# Patient Record
Sex: MALE | Race: BLACK OR AFRICAN AMERICAN | Employment: OTHER | URBAN - METROPOLITAN AREA
[De-identification: names, ages, dates, MRNs, and addresses within clinical notes are randomized per-mention and may not be internally consistent; named-entity substitution may affect disease eponyms.]

---

## 2017-09-01 ENCOUNTER — APPOINTMENT (OUTPATIENT)
Dept: GENERAL RADIOLOGY | Age: 62
DRG: 247 | End: 2017-09-01
Attending: EMERGENCY MEDICINE
Payer: MEDICARE

## 2017-09-01 ENCOUNTER — HOSPITAL ENCOUNTER (INPATIENT)
Age: 62
LOS: 3 days | Discharge: HOME | DRG: 247 | End: 2017-09-04
Attending: EMERGENCY MEDICINE | Admitting: INTERNAL MEDICINE
Payer: MEDICARE

## 2017-09-01 DIAGNOSIS — N17.9 ACUTE RENAL FAILURE, UNSPECIFIED ACUTE RENAL FAILURE TYPE (HCC): ICD-10-CM

## 2017-09-01 DIAGNOSIS — R07.9 ACUTE CHEST PAIN: Primary | ICD-10-CM

## 2017-09-01 DIAGNOSIS — R77.8 ELEVATED TROPONIN: ICD-10-CM

## 2017-09-01 PROBLEM — I10 ESSENTIAL HYPERTENSION: Status: ACTIVE | Noted: 2017-09-01

## 2017-09-01 LAB
ALBUMIN SERPL-MCNC: 3.6 G/DL (ref 3.5–5)
ALBUMIN/GLOB SERPL: 0.9 {RATIO} (ref 1.1–2.2)
ALP SERPL-CCNC: 60 U/L (ref 45–117)
ALT SERPL-CCNC: 40 U/L (ref 12–78)
ANION GAP SERPL CALC-SCNC: 5 MMOL/L (ref 5–15)
AST SERPL-CCNC: 22 U/L (ref 15–37)
ATRIAL RATE: 47 BPM
BASOPHILS # BLD: 0 K/UL (ref 0–0.1)
BASOPHILS NFR BLD: 0 % (ref 0–1)
BILIRUB SERPL-MCNC: 0.4 MG/DL (ref 0.2–1)
BUN SERPL-MCNC: 21 MG/DL (ref 6–20)
BUN/CREAT SERPL: 13 (ref 12–20)
CALCIUM SERPL-MCNC: 9.3 MG/DL (ref 8.5–10.1)
CALCULATED P AXIS, ECG09: 14 DEGREES
CALCULATED R AXIS, ECG10: -16 DEGREES
CALCULATED T AXIS, ECG11: 94 DEGREES
CHLORIDE SERPL-SCNC: 104 MMOL/L (ref 97–108)
CK SERPL-CCNC: 153 U/L (ref 39–308)
CO2 SERPL-SCNC: 28 MMOL/L (ref 21–32)
CREAT SERPL-MCNC: 1.62 MG/DL (ref 0.7–1.3)
D DIMER PPP FEU-MCNC: 0.19 MG/L FEU (ref 0–0.65)
DIAGNOSIS, 93000: NORMAL
EOSINOPHIL # BLD: 0.3 K/UL (ref 0–0.4)
EOSINOPHIL NFR BLD: 4 % (ref 0–7)
ERYTHROCYTE [DISTWIDTH] IN BLOOD BY AUTOMATED COUNT: 13.7 % (ref 11.5–14.5)
GLOBULIN SER CALC-MCNC: 3.9 G/DL (ref 2–4)
GLUCOSE SERPL-MCNC: 97 MG/DL (ref 65–100)
HCT VFR BLD AUTO: 48.6 % (ref 36.6–50.3)
HGB BLD-MCNC: 17.1 G/DL (ref 12.1–17)
LYMPHOCYTES # BLD: 2.7 K/UL (ref 0.8–3.5)
LYMPHOCYTES NFR BLD: 31 % (ref 12–49)
MCH RBC QN AUTO: 29.5 PG (ref 26–34)
MCHC RBC AUTO-ENTMCNC: 35.2 G/DL (ref 30–36.5)
MCV RBC AUTO: 83.8 FL (ref 80–99)
MONOCYTES # BLD: 0.8 K/UL (ref 0–1)
MONOCYTES NFR BLD: 9 % (ref 5–13)
NEUTS SEG # BLD: 5 K/UL (ref 1.8–8)
NEUTS SEG NFR BLD: 56 % (ref 32–75)
P-R INTERVAL, ECG05: 148 MS
PLATELET # BLD AUTO: 273 K/UL (ref 150–400)
POTASSIUM SERPL-SCNC: 3.3 MMOL/L (ref 3.5–5.1)
PROT SERPL-MCNC: 7.5 G/DL (ref 6.4–8.2)
Q-T INTERVAL, ECG07: 424 MS
QRS DURATION, ECG06: 98 MS
QTC CALCULATION (BEZET), ECG08: 375 MS
RBC # BLD AUTO: 5.8 M/UL (ref 4.1–5.7)
SODIUM SERPL-SCNC: 137 MMOL/L (ref 136–145)
TROPONIN I SERPL-MCNC: 0.47 NG/ML
VENTRICULAR RATE, ECG03: 47 BPM
WBC # BLD AUTO: 8.8 K/UL (ref 4.1–11.1)

## 2017-09-01 PROCEDURE — 74011000250 HC RX REV CODE- 250: Performed by: EMERGENCY MEDICINE

## 2017-09-01 PROCEDURE — 36415 COLL VENOUS BLD VENIPUNCTURE: CPT | Performed by: EMERGENCY MEDICINE

## 2017-09-01 PROCEDURE — C9113 INJ PANTOPRAZOLE SODIUM, VIA: HCPCS | Performed by: INTERNAL MEDICINE

## 2017-09-01 PROCEDURE — 74011250636 HC RX REV CODE- 250/636: Performed by: INTERNAL MEDICINE

## 2017-09-01 PROCEDURE — 85379 FIBRIN DEGRADATION QUANT: CPT | Performed by: EMERGENCY MEDICINE

## 2017-09-01 PROCEDURE — 84484 ASSAY OF TROPONIN QUANT: CPT | Performed by: EMERGENCY MEDICINE

## 2017-09-01 PROCEDURE — 96374 THER/PROPH/DIAG INJ IV PUSH: CPT

## 2017-09-01 PROCEDURE — 65660000000 HC RM CCU STEPDOWN

## 2017-09-01 PROCEDURE — 93005 ELECTROCARDIOGRAM TRACING: CPT

## 2017-09-01 PROCEDURE — 74011250637 HC RX REV CODE- 250/637: Performed by: EMERGENCY MEDICINE

## 2017-09-01 PROCEDURE — 74011250637 HC RX REV CODE- 250/637: Performed by: INTERNAL MEDICINE

## 2017-09-01 PROCEDURE — 80053 COMPREHEN METABOLIC PANEL: CPT | Performed by: EMERGENCY MEDICINE

## 2017-09-01 PROCEDURE — 74011250636 HC RX REV CODE- 250/636: Performed by: EMERGENCY MEDICINE

## 2017-09-01 PROCEDURE — 74011000250 HC RX REV CODE- 250: Performed by: INTERNAL MEDICINE

## 2017-09-01 PROCEDURE — 85025 COMPLETE CBC W/AUTO DIFF WBC: CPT | Performed by: EMERGENCY MEDICINE

## 2017-09-01 PROCEDURE — 99285 EMERGENCY DEPT VISIT HI MDM: CPT

## 2017-09-01 PROCEDURE — 82550 ASSAY OF CK (CPK): CPT | Performed by: EMERGENCY MEDICINE

## 2017-09-01 PROCEDURE — 71020 XR CHEST PA LAT: CPT

## 2017-09-01 RX ORDER — SODIUM CHLORIDE 0.9 % (FLUSH) 0.9 %
5-10 SYRINGE (ML) INJECTION AS NEEDED
Status: DISCONTINUED | OUTPATIENT
Start: 2017-09-01 | End: 2017-09-04 | Stop reason: HOSPADM

## 2017-09-01 RX ORDER — KETOROLAC TROMETHAMINE 30 MG/ML
30 INJECTION, SOLUTION INTRAMUSCULAR; INTRAVENOUS
Status: COMPLETED | OUTPATIENT
Start: 2017-09-01 | End: 2017-09-01

## 2017-09-01 RX ORDER — ASPIRIN 81 MG/1
81 TABLET ORAL
COMMUNITY

## 2017-09-01 RX ORDER — ASPIRIN 325 MG
325 TABLET ORAL ONCE
Status: COMPLETED | OUTPATIENT
Start: 2017-09-01 | End: 2017-09-01

## 2017-09-01 RX ORDER — ACETAMINOPHEN 325 MG/1
650 TABLET ORAL
Status: DISCONTINUED | OUTPATIENT
Start: 2017-09-01 | End: 2017-09-04 | Stop reason: HOSPADM

## 2017-09-01 RX ORDER — SIMETHICONE 125 MG
125-375 CAPSULE ORAL
COMMUNITY

## 2017-09-01 RX ORDER — CALCIUM CARBONATE 200(500)MG
200 TABLET,CHEWABLE ORAL
Status: DISCONTINUED | OUTPATIENT
Start: 2017-09-01 | End: 2017-09-04 | Stop reason: HOSPADM

## 2017-09-01 RX ORDER — NITROGLYCERIN 0.4 MG/1
0.4 TABLET SUBLINGUAL ONCE
Status: COMPLETED | OUTPATIENT
Start: 2017-09-01 | End: 2017-09-01

## 2017-09-01 RX ORDER — POTASSIUM CHLORIDE 20 MEQ/1
40 TABLET, EXTENDED RELEASE ORAL
Status: COMPLETED | OUTPATIENT
Start: 2017-09-01 | End: 2017-09-01

## 2017-09-01 RX ORDER — LANOLIN ALCOHOL/MO/W.PET/CERES
3 CREAM (GRAM) TOPICAL
Status: DISCONTINUED | OUTPATIENT
Start: 2017-09-01 | End: 2017-09-04 | Stop reason: HOSPADM

## 2017-09-01 RX ORDER — SODIUM CHLORIDE 9 MG/ML
100 INJECTION, SOLUTION INTRAVENOUS CONTINUOUS
Status: DISCONTINUED | OUTPATIENT
Start: 2017-09-01 | End: 2017-09-03

## 2017-09-01 RX ORDER — SODIUM CHLORIDE 0.9 % (FLUSH) 0.9 %
5-10 SYRINGE (ML) INJECTION EVERY 8 HOURS
Status: DISCONTINUED | OUTPATIENT
Start: 2017-09-01 | End: 2017-09-04 | Stop reason: HOSPADM

## 2017-09-01 RX ORDER — MORPHINE SULFATE 4 MG/ML
2 INJECTION, SOLUTION INTRAMUSCULAR; INTRAVENOUS
Status: DISCONTINUED | OUTPATIENT
Start: 2017-09-01 | End: 2017-09-04 | Stop reason: HOSPADM

## 2017-09-01 RX ADMIN — ACETAMINOPHEN 650 MG: 325 TABLET ORAL at 22:29

## 2017-09-01 RX ADMIN — LIDOCAINE HYDROCHLORIDE 40 ML: 20 SOLUTION ORAL; TOPICAL at 16:19

## 2017-09-01 RX ADMIN — NITROGLYCERIN 0.5 INCH: 20 OINTMENT TOPICAL at 17:33

## 2017-09-01 RX ADMIN — SODIUM CHLORIDE 100 ML/HR: 900 INJECTION, SOLUTION INTRAVENOUS at 20:13

## 2017-09-01 RX ADMIN — NITROGLYCERIN 0.4 MG: 0.4 TABLET SUBLINGUAL at 22:55

## 2017-09-01 RX ADMIN — ASPIRIN 325 MG ORAL TABLET 325 MG: 325 PILL ORAL at 17:32

## 2017-09-01 RX ADMIN — MELATONIN 3 MG ORAL TABLET 3 MG: 3 TABLET ORAL at 22:29

## 2017-09-01 RX ADMIN — POTASSIUM CHLORIDE 40 MEQ: 1500 TABLET, EXTENDED RELEASE ORAL at 20:51

## 2017-09-01 RX ADMIN — SODIUM CHLORIDE 40 MG: 9 INJECTION INTRAMUSCULAR; INTRAVENOUS; SUBCUTANEOUS at 20:52

## 2017-09-01 RX ADMIN — Medication 10 ML: at 20:52

## 2017-09-01 RX ADMIN — KETOROLAC TROMETHAMINE 30 MG: 30 INJECTION, SOLUTION INTRAMUSCULAR at 15:58

## 2017-09-01 RX ADMIN — NITROGLYCERIN 1 INCH: 20 OINTMENT TOPICAL at 22:29

## 2017-09-01 NOTE — CONSULTS
Subjective:                 [address], [place], [address] [facility]  [phone]    Date of  Admission: 9/1/2017  2:58 PM     Admission type:Emergency    Jose Norton is a 63 y.o. male presents with sscp that occurs when eating. It is relieved post eating but then he had an episode today that was unrelated to po intake. In the er, the pain was relieved with gi cocktail. It has been going on and off for the last 5 days. Patient Active Problem List    Diagnosis Date Noted    ARF (acute renal failure) ([facility] [address] 09/01/2017    Essential hypertension 09/01/2017    Troponin I above reference range 09/01/2017      Liban Maldonado, MD  Past Medical History:   Diagnosis Date    Colon polyps     High cholesterol     Hypertension       Past Surgical History:   Procedure Laterality Date    HX TONSILLECTOMY       No Known Allergies   Social History   Substance Use Topics    Smoking status: Never Smoker    Smokeless tobacco: Never Used    Alcohol use No     History reviewed. No pertinent family history. No current facility-administered medications for this encounter. Current Outpatient Prescriptions   Medication Sig    aspirin delayed-release 81 mg tablet Take 81 mg by mouth daily as needed (chest pain).  simethicone (GAS-X) 125 mg capsule Take 125-375 mg by mouth daily as needed (chest pain).  TETRAHYDROZOLINE HCL (VISINE OP) Administer 2 Drops to both eyes daily as needed (dry eyes).  atenolol-chlorthalidone (TENORETIC) 50-25 mg per tablet Take 1 Tab by mouth daily.  garlic cap Take 1 Cap by mouth daily.          Review of Symptoms:  Constitutional: negative  Eyes: negative  Ears, nose, mouth, throat, and face: negative  Respiratory: negative  Cardiovascular: sscp as above  Gastrointestinal: pain with eating  Genitourinary: negative  Musculoskeletal: negative  Neurological: negative  Behvioral/Psych: negative  Endocrine: negative     Subjective:      Visit Vitals    BP (!) 162/99    Pulse (!) 45    Temp 97.9 °F (36.6 °C)    Resp 8    Ht 5' 9\" (1.753 m)    Wt 259 lb (117.5 kg)    SpO2 99%    BMI 38.25 kg/m2       Physical Exam  Abdomen: soft, non-tender. Extremities: extremities normal  Heart: regular rate and rhythm  Lungs: clear to auscultation bilaterally  Pulses: 2+ and symmetric    Cardiographics    Telemetry: nsr    ECG: nsr, lvh with repol    Echocardiogram: pending    Labs:  Recent Labs      09/01/17   1534   WBC  8.8   HGB  17.1*   HCT  48.6   PLT  273     Recent Labs      09/01/17   1534   NA  137   K  3.3*   CL  104   CO2  28   GLU  97   BUN  21*   CREA  1.62*   CA  9.3   ALB  3.6   TBILI  0.4   SGOT  22   ALT  40       Recent Labs      09/01/17   1534   TROIQ  0.47*   CPK  153       No intake or output data in the 24 hours ending 09/01/17 1853      Assessment:     Assessment:       Active Problems:    ARF (acute renal failure) ([facility] [address] (9/1/2017)      Essential hypertension (9/1/2017)      Troponin I above reference range (9/1/2017)         Plan:     Talia Lane is a pleasant gentleman with sscp/epigastric pain that occurs with po intake and is relieved wit gi cocktail. He had a positive trop in the setting of elevated cr (no baseline). Dw Dr Baron Montelongo and hospitalist are kind enough to admit - we will hydrate - if cr less than 1.5 tmrw, will proceed with cardiac cath. Cont asa/atenolol. Will check statin. I discussed the risks/benefits/alternatives of the procedure with the patient. Risks include (but are not limited to) bleeding, heart block, infection, cva/mi/tamponade/death. The patient understands and agrees to proceed. Thank you for this interesting consultation.        Fernando Mauricio MD, [facility]    9/1/2017

## 2017-09-01 NOTE — ED NOTES
Care assumed of patient @ this time & intro with rounding done, with report from __Angelic OWENS RN_________________. Patient resting quietly on stretcher without verbal complaints.

## 2017-09-01 NOTE — IP AVS SNAPSHOT
[address] [facility] 
[phone] Patient: Jamila Rojas MRN: [id number] TPB:91/0/2220 You are allergic to the following Allergen Reactions Clindamycin Diarrhea Recent Documentation Height Weight BMI Smoking Status 1.753 m 117.5 kg 38.25 kg/m2 Never Smoker Emergency Contacts Name Discharge Info Relation Home Work Mobile [address] [facility]  Parent [1] [phone] About your hospitalization You were admitted on:  September 1, 2017 You last received care in the:  [facility] You were discharged on:  September 4, 2017 Unit phone number:  [phone] Why you were hospitalized Your primary diagnosis was:  Not on File Your diagnoses also included:  Arf (Acute Renal Failure) (Hcc), Essential Hypertension, Troponin I Above Reference Range, Chest Pain Providers Seen During Your Hospitalizations Provider Role Specialty Primary office phone Peewee Camarillo MD Attending Provider Emergency Medicine [phone] Adolph Aguillon MD Attending Provider Internal Medicine [phone] Your Primary Care Physician (PCP) Primary Care Physician Office Phone Office Fax OTHER, PHYS ** None ** ** None ** Follow-up Information Follow up With Details Comments Contact Info Liban Maldonado MD   Patient can only remember the practice name and not the physician Liban Maldonado MD In 1 week  Patient can only remember the practice name and not the physician Belinda Morataya MD In 1 week  [address] 
[phone] Current Discharge Medication List  
  
START taking these medications Dose & Instructions Dispensing Information Comments Morning Noon Evening Bedtime  
 amLODIPine 10 mg tablet Commonly known as:  Skip Newcomer Your last dose was: Your next dose is:    
   
   
 Dose:  10 mg Take 1 Tab by mouth daily for 30 days. Quantity:  30 Tab Refills:  0  
     
   
   
   
  
 atorvastatin 40 mg tablet Commonly known as:  LIPITOR Your last dose was: Your next dose is:    
   
   
 Dose:  40 mg Take 1 Tab by mouth daily for 30 days. Quantity:  30 Tab Refills:  0  
     
   
   
   
  
 clopidogrel 75 mg Tab Commonly known as:  PLAVIX Your last dose was: Your next dose is:    
   
   
 Dose:  75 mg Take 1 Tab by mouth daily for 30 days. Quantity:  30 Tab Refills:  0  
     
   
   
   
  
 famotidine 20 mg tablet Commonly known as:  PEPCID Your last dose was: Your next dose is:    
   
   
 Dose:  20 mg Take 1 Tab by mouth two (2) times a day for 14 days. Quantity:  28 Tab Refills:  0  
     
   
   
   
  
 isosorbide mononitrate ER 30 mg tablet Commonly known as:  IMDUR Your last dose was: Your next dose is:    
   
   
 Dose:  30 mg Take 1 Tab by mouth daily for 30 days. Quantity:  30 Tab Refills:  0 CONTINUE these medications which have NOT CHANGED Dose & Instructions Dispensing Information Comments Morning Noon Evening Bedtime  
 aspirin delayed-release 81 mg tablet Your last dose was: Your next dose is:    
   
   
 Dose:  81 mg Take 81 mg by mouth daily as needed (chest pain). Refills:  0  
     
   
   
   
  
 garlic Cap Your last dose was: Your next dose is:    
   
   
 Dose:  1 Cap Take 1 Cap by mouth daily. Refills:  0 Gas-X 125 mg capsule Generic drug:  simethicone Your last dose was: Your next dose is:    
   
   
 Dose:  125-375 mg Take 125-375 mg by mouth daily as needed (chest pain). Refills:  0  
     
   
   
   
  
 VISINE OP Your last dose was: Your next dose is:    
   
   
 Dose:  2 Drop Administer 2 Drops to both eyes daily as needed (dry eyes). Refills:  0 STOP taking these medications   
 atenolol-chlorthalidone 50-25 mg per tablet Commonly known as:  Lindsay Kill Where to Get Your Medications Information on where to get these meds will be given to you by the nurse or doctor. ! Ask your nurse or doctor about these medications  
  amLODIPine 10 mg tablet  
 atorvastatin 40 mg tablet  
 clopidogrel 75 mg Tab  
 famotidine 20 mg tablet  
 isosorbide mononitrate ER 30 mg tablet Discharge Instructions Diet : Heart Health diet. Activity: Avoid heavy exertion for next 2 weeks until cleared by your cardiologist. 
 
Mehrdad Roland in 1 week with Pcp. Discharge Orders None ACO Transitions of Care Introducing [employer] Jasmyne Zeng offers a voluntary care coordination program to provide high quality service and care to [facility] fee-for-service beneficiaries. Selena Conley was designed to help you enhance your health and well-being through the following services: ? Transitions of Care  support for individuals who are transitioning from one care setting to another (example: Hospital to home). ? Chronic and Complex Care Coordination  support for individuals and caregivers of those with serious or chronic illnesses or with more than one chronic (ongoing) condition and those who take a number of different medications. If you meet specific medical criteria, a [facility] [address] may call you directly to coordinate your care with your primary care physician and your other care providers. For questions about the [facility] programs, please, contact your physicians office. For general questions or additional information about Accountable Care Organizations: 
Please visit www.medicare.gov/acos. html or call 1-800-MEDICARE (8-515.549.5217) [employer] users should call 7-303.714.7122. Introducing [facility]! Claudy Severino introduces [employer] patient portal. Now you can access parts of your medical record, email your doctor's office, and request medication refills online. 1. In your internet browser, go to [email or web address] 2. Click on the First Time User? Click Here link in the Sign In box. You will see the New Member Sign Up page. 3. Enter your [employer] Access Code exactly as it appears below. You will not need to use this code after youve completed the sign-up process. If you do not sign up before the expiration date, you must request a new code. · [employer] Access Code: [email or web address] Expires: 11/30/2017  3:07 PM 
 
4. Enter the last four digits of your Social Security Number (xxxx) and Date of Birth (mm/dd/yyyy) as indicated and click Submit. You will be taken to the next sign-up page. 5. Create a [employer] ID. This will be your [employer] login ID and cannot be changed, so think of one that is secure and easy to remember. 6. Create a [employer] password. You can change your password at any time. 7. Enter your Password Reset Question and Answer. This can be used at a later time if you forget your password. 8. Enter your e-mail address. You will receive e-mail notification when new information is available in [address] 9. Click Sign Up. You can now view and download portions of your medical record. 10. Click the Download Summary menu link to download a portable copy of your medical information. If you have questions, please visit the Frequently Asked Questions section of the [employer] website. Remember, [employer] is NOT to be used for urgent needs. For medical emergencies, dial 911. Now available from your iPhone and Android! General Information Please provide this summary of care documentation to your next provider.  
  
  
    
    
 Patient Signature: ____________________________________________________________ Date:  ____________________________________________________________  
  
Jose Shackle Provider Signature:  ____________________________________________________________ Date:  ____________________________________________________________

## 2017-09-01 NOTE — PROGRESS NOTES
Pharmacy Clarification of Prior to Admission Medication Regimen     The patient was interviewed regarding clarification of the prior to admission medication regimen and was questioned regarding use of any other inhalers, topical products, over the counter medications, herbal medications, vitamin products or ophthalmic/nasal/otic medication use. Information Obtained From: Patient    Pertinent Pharmacy Findings:   Amlodipine 10 mg: Patient stated he was prescribed this agent 'a month ago' (10 mg daily) but is not taking this agent.  aspirin delayed-release 81 mg tablet: Patient stated he has only taken this agent the 'past week' due to his chest pain/discomfort.  simethicone (GAS-X) 125 mg capsule: Patient stated he has only taken this agent 'the past few days' due to his chest pain/discomfort.  garlic cap: Patient stated he started the agent 8/29/17, and only took two doses (8/29/17 and 8/30/17) before he 'stopped taking them', because he thought 'they were causing his chest pain/discomfort'. PTA medication list was corrected to the following:     Prior to Admission Medications   Prescriptions Last Dose Informant Patient Reported? Taking? TETRAHYDROZOLINE HCL (VISINE OP) 8/31/2017 at Unknown time Self Yes Yes   Sig: Administer 2 Drops to both eyes daily as needed (dry eyes). aspirin delayed-release 81 mg tablet 8/31/2017 at Unknown time Self Yes Yes   Sig: Take 81 mg by mouth daily as needed (chest pain). atenolol-chlorthalidone (TENORETIC) 50-25 mg per tablet 9/1/2017 at Unknown time Self Yes Yes   Sig: Take 1 Tab by mouth daily. garlic cap 2/82/8786 at Unknown time Self Yes No   Sig: Take 1 Cap by mouth daily. simethicone (GAS-X) 125 mg capsule 9/1/2017 at Unknown time Self Yes Yes   Sig: Take 125-375 mg by mouth daily as needed (chest pain).       Facility-Administered Medications: None          Thank you,  Melvin Blood CP  Medication History Pharmacy Technician

## 2017-09-01 NOTE — ED NOTES
Bedside and Verbal shift change report given to Gabriel Heard RN (oncoming nurse) by Steph Mccurdy RN (offgoing nurse). Report included the following information SBAR, Kardex and ED Summary.

## 2017-09-01 NOTE — ED PROVIDER NOTES
HPI Comments: Richard Hook is a 63 y.o. male with PMHx of HTN and high cholesterol presenting ambulatory to [facility] c/o intermittent mid/right sided chest pain since 5 days ago. Pt notes that 5 days ago he first had the chest pain, and he took some aspirin which resolved his symptoms. However, today at ~1300 his chest pain returned. Pt rates the chest pain as 8-9/10. At the onset of his pain, he was driving. Pt notes that his chest pain is exacerbated by walking and when taking a deep breath. He reports additional symptoms of bilateral hand pain, right shoulder pain, right upper back pain, left arm pain, SOB, and diaphoresis. Pt notes that he has recently driven to [facility], and to [facility]. He reports that he has had a stress test a few years ago which was normal. Pt denies nausea, coughing, leg pain, leg swelling, neck pain, jaw pain, or recent heavy lifting. PCP: Liban Maldonado MD  Social Hx: - tobacco, - EtOH    There are no other complaints, changes, or physical findings at this time. The history is provided by the patient. No  was used. Past Medical History:   Diagnosis Date    Colon polyps     High cholesterol     Hypertension        Past Surgical History:   Procedure Laterality Date    HX TONSILLECTOMY           Family History:   Problem Relation Age of Onset    Coronary Artery Disease Father        Social History     Social History    Marital status: SINGLE     Spouse name: N/A    Number of children: N/A    Years of education: N/A     Occupational History    Not on file. Social History Main Topics    Smoking status: Never Smoker    Smokeless tobacco: Never Used    Alcohol use No    Drug use: No    Sexual activity: Not on file     Other Topics Concern    Not on file     Social History Narrative         ALLERGIES: Review of patient's allergies indicates no known allergies. Review of Systems   Constitutional: Positive for diaphoresis. Negative for fever. HENT: Negative. Negative for congestion. Eyes: Negative. Respiratory: Positive for shortness of breath. Negative for cough. Cardiovascular: Positive for chest pain. Negative for leg swelling. Gastrointestinal: Negative for nausea and vomiting. Endocrine: Negative for heat intolerance. Genitourinary: Negative. Negative for dysuria. Musculoskeletal: Positive for arthralgias (bilateral hands, right shoulder), back pain (right upper) and myalgias (left arm). Negative for neck pain. Skin: Negative. Allergic/Immunologic: Negative for immunocompromised state. Neurological: Negative. Negative for dizziness. Hematological: Does not bruise/bleed easily. Psychiatric/Behavioral: Negative. All other systems reviewed and are negative. Patient Vitals for the past 12 hrs:   Temp Pulse Resp BP SpO2   09/01/17 2030 - (!) 51 10 149/84 97 %   09/01/17 2021 - (!) 51 13 153/88 97 %   09/01/17 1926 - (!) 46 16 (!) 170/95 98 %   09/01/17 1900 - (!) 53 19 (!) 163/101 97 %   09/01/17 1849 - (!) 49 12 - 97 %   09/01/17 1848 - - - (!) 171/99 -   09/01/17 1830 - (!) 46 15 (!) 195/99 99 %   09/01/17 1730 - (!) 45 8 (!) 162/99 99 %   09/01/17 1700 - (!) 46 10 (!) 161/94 98 %   09/01/17 1632 - (!) 45 9 148/89 97 %   09/01/17 1520 97.9 °F (36.6 °C) (!) 48 24 (!) 161/95 96 %   09/01/17 1508 - - - - 98 %   09/01/17 1507 - - - (!) 161/95 -            Physical Exam   Constitutional: He is oriented to person, place, and time. He appears well-nourished. No distress. Elevated BMI   HENT:   Head: Normocephalic and atraumatic. Eyes: EOM are normal. Pupils are equal, round, and reactive to light. Neck: Normal range of motion. Neck supple. Cardiovascular: Regular rhythm and normal heart sounds. Bradycardia present. Pulmonary/Chest: Effort normal and breath sounds normal. He has no wheezes. He exhibits no tenderness. No chest wall tenderness   Abdominal: Soft.  Bowel sounds are normal. There is no tenderness. Musculoskeletal: Normal range of motion. He exhibits tenderness. He exhibits no edema. Right posterior shoulder tenderness, non reproducible   Right upper back pain, non reproducible  No calf edema or tenderness   Neurological: He is alert and oriented to person, place, and time. No cranial nerve deficit. Skin: Skin is warm and dry. Psychiatric: He has a normal mood and affect. Nursing note and vitals reviewed. MDM  Number of Diagnoses or Management Options  Acute chest pain:   Acute renal failure, unspecified acute renal failure type [facility]):   Elevated troponin:   Diagnosis management comments: DDx: CAD, PE, pleurisy, musculoskeletal pain, PUD, gastritis       Amount and/or Complexity of Data Reviewed  Clinical lab tests: ordered and reviewed  Tests in the radiology section of CPT®: ordered and reviewed  Tests in the medicine section of CPT®: ordered and reviewed  Review and summarize past medical records: yes  Discuss the patient with other providers: yes (Cardiology)  Independent visualization of images, tracings, or specimens: yes    Patient Progress  Patient progress: stable    ED Course       Procedures    EKG interpretation: (Preliminary)  15:03  Rhythm: sinus bradycardia; and regular . Rate (approx.): 47; Axis: normal; VT interval: normal; QRS interval: normal ; ST/T wave: T wave abnormality; Other findings: No prior EKGs.    4:09 PM  Pt reports that the pain that was on his left side is better now. However, the pain that his in the middle of his chest is a 7/10. He reports that his chest pain feels more like a \"burning\" sensation now. Written by JOSÉ Burns, as dictated by Lopez Perez MD.    4:46 PM  Pt reports that his chest pain is gone with GI cocktail. He notes that he has a sensation on his left side, but it is mostly when he moves.   Written by JOSÉ Burns, as dictated by Lopez Perez MD.    CONSULT NOTE:  4:58 PM  Lopez Perez MD spoke with Dr. Heidy Bruno  Specialty: Cardiology  Discussed pt's hx, disposition, and available diagnostic and imaging results. Reviewed care plans. Consultant agrees with plans as outlined. Dr. Joaquim Robison will come evaluate pt. Written by Wyatt Cordova, ED Scribe, as dictated by Ferd Mohs, MD.    CONSULT NOTE:  6:48 PM  Ferd Mohs, MD spoke with Dr. Heidy Bruno  Specialty: Cardiology  Discussed pt's hx, disposition, and available diagnostic and imaging results. Reviewed care plans. Consultant agrees with plans as outlined. Dr. Joaquim Robison said that he will have the hospitalist admit the pt. Written by Wyatt Cordova, JOSÉ Scribe, as dictated by Ferd Mohs, MD.    LABORATORY TESTS:  Recent Results (from the past 12 hour(s))   EKG, 12 LEAD, INITIAL    Collection Time: 09/01/17  3:03 PM   Result Value Ref Range    Ventricular Rate 47 BPM    Atrial Rate 47 BPM    P-R Interval 148 ms    QRS Duration 98 ms    Q-T Interval 424 ms    QTC Calculation (Bezet) 375 ms    Calculated P Axis 14 degrees    Calculated R Axis -16 degrees    Calculated T Axis 94 degrees    Diagnosis       Sinus bradycardia  Voltage criteria for left ventricular hypertrophy with repolarization   abnormality  No previous ECGs available  Confirmed by Rubén Morales ([id number]) on 9/1/2017 4:23:38 PM     CBC WITH AUTOMATED DIFF    Collection Time: 09/01/17  3:34 PM   Result Value Ref Range    WBC 8.8 4.1 - 11.1 K/uL    RBC 5.80 (H) 4.10 - 5.70 M/uL    HGB 17.1 (H) 12.1 - 17.0 g/dL    HCT 48.6 36.6 - 50.3 %    MCV 83.8 80.0 - 99.0 FL    MCH 29.5 26.0 - 34.0 PG    MCHC 35.2 30.0 - 36.5 g/dL    RDW 13.7 11.5 - 14.5 %    PLATELET 406 883 - 606 K/uL    NEUTROPHILS 56 32 - 75 %    LYMPHOCYTES 31 12 - 49 %    MONOCYTES 9 5 - 13 %    EOSINOPHILS 4 0 - 7 %    BASOPHILS 0 0 - 1 %    ABS. NEUTROPHILS 5.0 1.8 - 8.0 K/UL    ABS. LYMPHOCYTES 2.7 0.8 - 3.5 K/UL    ABS. MONOCYTES 0.8 0.0 - 1.0 K/UL    ABS. EOSINOPHILS 0.3 0.0 - 0.4 K/UL    ABS.  BASOPHILS 0.0 0.0 - 0.1 K/UL   METABOLIC PANEL, COMPREHENSIVE    Collection Time: 09/01/17  3:34 PM   Result Value Ref Range    Sodium 137 136 - 145 mmol/L    Potassium 3.3 (L) 3.5 - 5.1 mmol/L    Chloride 104 97 - 108 mmol/L    CO2 28 21 - 32 mmol/L    Anion gap 5 5 - 15 mmol/L    Glucose 97 65 - 100 mg/dL    BUN 21 (H) 6 - 20 MG/DL    Creatinine 1.62 (H) 0.70 - 1.30 MG/DL    BUN/Creatinine ratio 13 12 - 20      GFR est AA 53 (L) >60 ml/min/1.73m2    GFR est non-AA 44 (L) >60 ml/min/1.73m2    Calcium 9.3 8.5 - 10.1 MG/DL    Bilirubin, total 0.4 0.2 - 1.0 MG/DL    ALT (SGPT) 40 12 - 78 U/L    AST (SGOT) 22 15 - 37 U/L    Alk. phosphatase 60 45 - 117 U/L    Protein, total 7.5 6.4 - 8.2 g/dL    Albumin 3.6 3.5 - 5.0 g/dL    Globulin 3.9 2.0 - 4.0 g/dL    A-G Ratio 0.9 (L) 1.1 - 2.2     CK    Collection Time: 09/01/17  3:34 PM   Result Value Ref Range     39 - 308 U/L   TROPONIN I    Collection Time: 09/01/17  3:34 PM   Result Value Ref Range    Troponin-I, Qt. 0.47 (H) <0.05 ng/mL   D DIMER    Collection Time: 09/01/17  3:34 PM   Result Value Ref Range    D-dimer 0.19 0.00 - 0.65 mg/L FEU       IMAGING RESULTS:    EXAM:  XR CHEST PA LAT     INDICATION:  Intermittent mid/right sided chest pain since 5 days ago.     COMPARISON: None.     FINDINGS: PA and lateral radiographs of the chest demonstrate clear lungs. The  cardiac and mediastinal contours and pulmonary vascularity are normal. There is  tortuosity of the thoracic aorta. The bones and soft tissues are within normal  limits.      IMPRESSION  IMPRESSION: No acute findings.        MEDICATIONS GIVEN:  Medications   0.9% sodium chloride infusion (100 mL/hr IntraVENous New Bag 9/1/17 2013)   sodium chloride (NS) flush 5-10 mL (not administered)   sodium chloride (NS) flush 5-10 mL (10 mL IntraVENous Given 9/1/17 2052)   acetaminophen (TYLENOL) tablet 650 mg (not administered)   melatonin tablet 3 mg (not administered)   pantoprazole (PROTONIX) 40 mg in sodium chloride 0.9 % 10 mL injection (40 mg IntraVENous Given 9/1/17 2052)   calcium carbonate (TUMS) chewable tablet 200 mg [elemental] (not administered)   nitroglycerin (NITROBID) 2 % ointment 1 Inch (not administered)   ketorolac (TORADOL) injection 30 mg (30 mg IntraVENous Given 9/1/17 1558)   mylanta/viscous lidocaine (SHANNON)(GI COCKTAIL) (40 mL Oral Given 9/1/17 1619)   nitroglycerin (NITROBID) 2 % ointment 0.5 Inch (0.5 Inches Topical Given 9/1/17 1733)   aspirin (ASPIRIN) tablet 325 mg (325 mg Oral Given 9/1/17 1732)   potassium chloride (K-DUR, KLOR-CON) SR tablet 40 mEq (40 mEq Oral Given 9/1/17 2051)       IMPRESSION:  1. Acute chest pain    2. Elevated troponin    3. Acute renal failure, unspecified acute renal failure type ([facility] [address]        PLAN:  1. Admit to hospitalist    ADMIT NOTE:  6:48 PM  Patient is being admitted to the hospital by Dr. Hima Mi. The results of their tests and reasons for their admission have been discussed with the patient and/or available family. They convey agreement and understanding for the need to be admitted and for their admission diagnosis. This note is prepared by Serenity Blood, acting as Scribe for MD Dai Hull MD: The scribe's documentation has been prepared under my direction and personally reviewed by me in its entirety. I confirm that the note above accurately reflects all work, treatment, procedures, and medical decision making performed by me.

## 2017-09-01 NOTE — ED TRIAGE NOTES
Patient presents to ED ambulatory with c/o chest pain x5 days that was intermittent. He took aspirin and anti-acids which improved sx. He noted that when he ate the pain would return. Patient reports he came to ED today because the pain started again even tough he had not had anything to eat.

## 2017-09-02 LAB
ALBUMIN SERPL-MCNC: 3.1 G/DL (ref 3.5–5)
ALBUMIN/GLOB SERPL: 0.8 {RATIO} (ref 1.1–2.2)
ALP SERPL-CCNC: 50 U/L (ref 45–117)
ALT SERPL-CCNC: 33 U/L (ref 12–78)
ANION GAP SERPL CALC-SCNC: 8 MMOL/L (ref 5–15)
AST SERPL-CCNC: 21 U/L (ref 15–37)
ATRIAL RATE: 44 BPM
BILIRUB SERPL-MCNC: 0.4 MG/DL (ref 0.2–1)
BUN SERPL-MCNC: 21 MG/DL (ref 6–20)
BUN/CREAT SERPL: 14 (ref 12–20)
CALCIUM SERPL-MCNC: 8.4 MG/DL (ref 8.5–10.1)
CALCULATED P AXIS, ECG09: 23 DEGREES
CALCULATED R AXIS, ECG10: -10 DEGREES
CALCULATED T AXIS, ECG11: 67 DEGREES
CHLORIDE SERPL-SCNC: 107 MMOL/L (ref 97–108)
CHOLEST SERPL-MCNC: 270 MG/DL
CO2 SERPL-SCNC: 24 MMOL/L (ref 21–32)
CREAT SERPL-MCNC: 1.51 MG/DL (ref 0.7–1.3)
DIAGNOSIS, 93000: NORMAL
GLOBULIN SER CALC-MCNC: 3.7 G/DL (ref 2–4)
GLUCOSE SERPL-MCNC: 93 MG/DL (ref 65–100)
HDLC SERPL-MCNC: 34 MG/DL
HDLC SERPL: 7.9 {RATIO} (ref 0–5)
INR PPP: 1.1 (ref 0.9–1.1)
LDLC SERPL CALC-MCNC: 185.8 MG/DL (ref 0–100)
LIPID PROFILE,FLP: ABNORMAL
MAGNESIUM SERPL-MCNC: 2 MG/DL (ref 1.6–2.4)
P-R INTERVAL, ECG05: 152 MS
POTASSIUM SERPL-SCNC: 3.7 MMOL/L (ref 3.5–5.1)
PROT SERPL-MCNC: 6.8 G/DL (ref 6.4–8.2)
PROTHROMBIN TIME: 10.6 SEC (ref 9–11.1)
Q-T INTERVAL, ECG07: 480 MS
QRS DURATION, ECG06: 98 MS
QTC CALCULATION (BEZET), ECG08: 410 MS
SODIUM SERPL-SCNC: 139 MMOL/L (ref 136–145)
TRIGL SERPL-MCNC: 251 MG/DL (ref ?–150)
TROPONIN I SERPL-MCNC: 0.66 NG/ML
VENTRICULAR RATE, ECG03: 44 BPM
VLDLC SERPL CALC-MCNC: 50.2 MG/DL

## 2017-09-02 PROCEDURE — 74011000250 HC RX REV CODE- 250: Performed by: INTERNAL MEDICINE

## 2017-09-02 PROCEDURE — 74011250636 HC RX REV CODE- 250/636: Performed by: INTERNAL MEDICINE

## 2017-09-02 PROCEDURE — 36415 COLL VENOUS BLD VENIPUNCTURE: CPT | Performed by: INTERNAL MEDICINE

## 2017-09-02 PROCEDURE — 80053 COMPREHEN METABOLIC PANEL: CPT | Performed by: INTERNAL MEDICINE

## 2017-09-02 PROCEDURE — C9113 INJ PANTOPRAZOLE SODIUM, VIA: HCPCS | Performed by: INTERNAL MEDICINE

## 2017-09-02 PROCEDURE — 84484 ASSAY OF TROPONIN QUANT: CPT | Performed by: INTERNAL MEDICINE

## 2017-09-02 PROCEDURE — 65660000000 HC RM CCU STEPDOWN

## 2017-09-02 PROCEDURE — 74011250637 HC RX REV CODE- 250/637: Performed by: INTERNAL MEDICINE

## 2017-09-02 PROCEDURE — 83735 ASSAY OF MAGNESIUM: CPT | Performed by: INTERNAL MEDICINE

## 2017-09-02 PROCEDURE — 85610 PROTHROMBIN TIME: CPT | Performed by: INTERNAL MEDICINE

## 2017-09-02 PROCEDURE — 93306 TTE W/DOPPLER COMPLETE: CPT

## 2017-09-02 PROCEDURE — 80061 LIPID PANEL: CPT | Performed by: INTERNAL MEDICINE

## 2017-09-02 RX ORDER — AMLODIPINE BESYLATE 5 MG/1
10 TABLET ORAL DAILY
Status: DISCONTINUED | OUTPATIENT
Start: 2017-09-03 | End: 2017-09-04 | Stop reason: HOSPADM

## 2017-09-02 RX ORDER — ATORVASTATIN CALCIUM 40 MG/1
40 TABLET, FILM COATED ORAL DAILY
Status: DISCONTINUED | OUTPATIENT
Start: 2017-09-02 | End: 2017-09-04 | Stop reason: HOSPADM

## 2017-09-02 RX ORDER — GUAIFENESIN 100 MG/5ML
81 LIQUID (ML) ORAL DAILY
Status: DISCONTINUED | OUTPATIENT
Start: 2017-09-02 | End: 2017-09-04 | Stop reason: HOSPADM

## 2017-09-02 RX ORDER — ENOXAPARIN SODIUM 100 MG/ML
1 INJECTION SUBCUTANEOUS ONCE
Status: COMPLETED | OUTPATIENT
Start: 2017-09-02 | End: 2017-09-02

## 2017-09-02 RX ADMIN — NITROGLYCERIN 1 INCH: 20 OINTMENT TOPICAL at 11:26

## 2017-09-02 RX ADMIN — ACETAMINOPHEN 650 MG: 325 TABLET ORAL at 23:07

## 2017-09-02 RX ADMIN — SODIUM CHLORIDE 100 ML/HR: 900 INJECTION, SOLUTION INTRAVENOUS at 18:00

## 2017-09-02 RX ADMIN — NITROGLYCERIN 1 INCH: 20 OINTMENT TOPICAL at 17:53

## 2017-09-02 RX ADMIN — SODIUM CHLORIDE 40 MG: 9 INJECTION INTRAMUSCULAR; INTRAVENOUS; SUBCUTANEOUS at 09:24

## 2017-09-02 RX ADMIN — NITROGLYCERIN 1 INCH: 20 OINTMENT TOPICAL at 23:10

## 2017-09-02 RX ADMIN — SODIUM CHLORIDE 100 ML/HR: 900 INJECTION, SOLUTION INTRAVENOUS at 23:11

## 2017-09-02 RX ADMIN — CALCIUM CARBONATE (ANTACID) CHEW TAB 500 MG 200 MG: 500 CHEW TAB at 23:09

## 2017-09-02 RX ADMIN — ENOXAPARIN SODIUM 120 MG: 60 INJECTION, SOLUTION INTRAVENOUS; SUBCUTANEOUS at 09:17

## 2017-09-02 RX ADMIN — MELATONIN 3 MG ORAL TABLET 3 MG: 3 TABLET ORAL at 23:08

## 2017-09-02 RX ADMIN — ATORVASTATIN CALCIUM 40 MG: 40 TABLET, FILM COATED ORAL at 11:26

## 2017-09-02 RX ADMIN — Medication 10 ML: at 23:08

## 2017-09-02 RX ADMIN — ACETAMINOPHEN 650 MG: 325 TABLET ORAL at 17:53

## 2017-09-02 RX ADMIN — ASPIRIN 81 MG 81 MG: 81 TABLET ORAL at 09:17

## 2017-09-02 RX ADMIN — Medication 10 ML: at 17:54

## 2017-09-02 NOTE — H&P
Hospitalist Admission Note    NAME: Joanna Styles   :  1955   MRN:  [id number]     Date/Time:  2017 8:01 PM    Patient PCP: Lanie Maldonado MD  ________________________________________________________________________    My assessment of this patient's clinical condition and my plan of care is as follows. Assessment / Plan:    Chest pain  Heartburn  Elevated troponin  -atypical CP describing heartburn improved with tums and worse with activity  -telemetry monitoring, cycle troponin  -nitropaste   -PPI and tums prn  -Cardiology plans cath in AM if Cr improves    Bradycardia  HTN  -stop atenolol   -hydralazine prn    JIM  Hypokalemia  -stop chlorthiazide. IVF hydration overnight      Code Status:   Full  Surrogate Decision Maker:  No mPOA    DVT Prophylaxis:   SCD  GI Prophylaxis: not indicated          Subjective:   CHIEF COMPLAINT:   Chest pains    HISTORY OF PRESENT ILLNESS:     Joanna Styles is a 63 y.o.  male who presents with recurrent chest pains. It started about 5 days ago as like heartburn located over his sternum and epigastric area. This improved with tums. It then recurred a day later and resolved with ASA and gas X. Today his heartburn recurred but felt worse with ambulation so he presented to the ER for evaluation. He was found to have an elevated troponin and JIM / Cr 1.6. Cardiology was consulted and we were asked to admit for work up and evaluation of the above problems. He has no prior history of CAD, PE or PUD.       Past Medical History:   Diagnosis Date    Colon polyps     High cholesterol     Hypertension         Past Surgical History:   Procedure Laterality Date    HX TONSILLECTOMY         Social History   Substance Use Topics    Smoking status: Never Smoker    Smokeless tobacco: Never Used    Alcohol use No        Family History   Problem Relation Age of Onset    Coronary Artery Disease Father      No Known Allergies     Prior to Admission medications    Medication Sig Start Date End Date Taking? Authorizing Provider   aspirin delayed-release 81 mg tablet Take 81 mg by mouth daily as needed (chest pain). Yes Liban Maldonado MD   simethicone (GAS-X) 125 mg capsule Take 125-375 mg by mouth daily as needed (chest pain). Yes Liban Maldonado MD   TETRAHYDROZOLINE HCL (VISINE OP) Administer 2 Drops to both eyes daily as needed (dry eyes). Yes Liban Maldonado MD   atenolol-chlorthalidone (TENORETIC) 50-25 mg per tablet Take 1 Tab by mouth daily. Yes Liban Maldonado MD   garlic cap Take 1 Cap by mouth daily.     Liban Maldonado MD       REVIEW OF SYSTEMS:       Total of 12 systems reviewed as follows:       POSITIVE= underlined text  Negative = text not underlined  General:  fever, chills, sweats, generalized weakness, weight loss/gain,      loss of appetite   Eyes:    blurred vision, eye pain, loss of vision, double vision  ENT:    rhinorrhea, pharyngitis   Respiratory:   cough, sputum production, SOB, SURESH, wheezing, pleuritic pain   Cardiology:   chest pain, palpitations, orthopnea, PND, edema, syncope   Gastrointestinal:  abdominal pain , N/V, diarrhea, dysphagia, constipation, bleeding , heartburn  Genitourinary:  frequency, urgency, dysuria, hematuria, incontinence   Muskuloskeletal :  arthralgia, myalgia, back pain  Hematology:  easy bruising, nose or gum bleeding, lymphadenopathy   Dermatological: rash, ulceration, pruritis, color change / jaundice  Endocrine:   hot flashes or polydipsia   Neurological:  headache, dizziness, confusion, focal weakness, paresthesia,     Speech difficulties, memory loss, gait difficulty  Psychological: Feelings of anxiety, depression, agitation    Objective:   VITALS:    Visit Vitals    BP (!) 170/95    Pulse (!) 46    Temp 97.9 °F (36.6 °C)    Resp 16    Ht 5' 9\" (1.753 m)    Wt 117.5 kg (259 lb)    SpO2 98%    BMI 38.25 kg/m2       PHYSICAL EXAM:    General:    Alert, cooperative, no distress, appears stated age.     HEENT: Atraumatic, anicteric sclerae, pink conjunctivae     No oral ulcers, mucosa moist, throat clear, dentition fair  Neck:  Supple, symmetrical,  thyroid: non tender  Lungs:   Clear to auscultation bilaterally. No Wheezing or Rhonchi. No rales. Chest wall:  No tenderness  No Accessory muscle use. Heart:   Regular  rhythm,  No  murmur   No edema  Abdomen:   Soft, non-tender. Not distended. Bowel sounds normal  Extremities: No cyanosis. No clubbing,  Skin turgor normal, Capillary refill normal, Radial dial pulse 2+  Skin:     Not pale. Not Jaundiced  No rashes   Psych:  Good insight. Not depressed. Not anxious or agitated. Neurologic: EOMs intact. No facial asymmetry. No aphasia or slurred speech. Symmetrical strength, Sensation grossly intact. Alert and oriented X 4.     _______________________________________________________________________  Care Plan discussed with:    Comments   Patient x    Family      RN     Care Manager                    Consultant:      _______________________________________________________________________  Expected  Disposition:   Home with Family x   HH/PT/OT/RN    SNF/LTC    [facility]    ________________________________________________________________________  TOTAL TIME:  48   Minutes    Critical Care Provided     Minutes non procedure based      Comments    x Reviewed previous records   >50% of visit spent in counseling and coordination of care  Discussion with patient and/or family and questions answered       Given the patient's current clinical presentation, I have a high level of concern for decompensation if discharged from the ED. Complex decision making was performed which includes reviewing the patient's available past medical records, laboratory results, and Xray films.  I have also directly communicated my plan and discussed this case with the involved ED physician.     ____________________________________________________________________  Loli Grajeda MD    Procedures: see electronic medical records for all procedures/Xrays and details which were not copied into this note but were reviewed prior to creation of Plan. LAB DATA REVIEWED:    Recent Results (from the past 24 hour(s))   EKG, 12 LEAD, INITIAL    Collection Time: 09/01/17  3:03 PM   Result Value Ref Range    Ventricular Rate 47 BPM    Atrial Rate 47 BPM    P-R Interval 148 ms    QRS Duration 98 ms    Q-T Interval 424 ms    QTC Calculation (Bezet) 375 ms    Calculated P Axis 14 degrees    Calculated R Axis -16 degrees    Calculated T Axis 94 degrees    Diagnosis       Sinus bradycardia  Voltage criteria for left ventricular hypertrophy with repolarization   abnormality  No previous ECGs available  Confirmed by Laveda Stain ([id number]) on 9/1/2017 4:23:38 PM     CBC WITH AUTOMATED DIFF    Collection Time: 09/01/17  3:34 PM   Result Value Ref Range    WBC 8.8 4.1 - 11.1 K/uL    RBC 5.80 (H) 4.10 - 5.70 M/uL    HGB 17.1 (H) 12.1 - 17.0 g/dL    HCT 48.6 36.6 - 50.3 %    MCV 83.8 80.0 - 99.0 FL    MCH 29.5 26.0 - 34.0 PG    MCHC 35.2 30.0 - 36.5 g/dL    RDW 13.7 11.5 - 14.5 %    PLATELET 657 021 - 479 K/uL    NEUTROPHILS 56 32 - 75 %    LYMPHOCYTES 31 12 - 49 %    MONOCYTES 9 5 - 13 %    EOSINOPHILS 4 0 - 7 %    BASOPHILS 0 0 - 1 %    ABS. NEUTROPHILS 5.0 1.8 - 8.0 K/UL    ABS. LYMPHOCYTES 2.7 0.8 - 3.5 K/UL    ABS. MONOCYTES 0.8 0.0 - 1.0 K/UL    ABS. EOSINOPHILS 0.3 0.0 - 0.4 K/UL    ABS.  BASOPHILS 0.0 0.0 - 0.1 K/UL   METABOLIC PANEL, COMPREHENSIVE    Collection Time: 09/01/17  3:34 PM   Result Value Ref Range    Sodium 137 136 - 145 mmol/L    Potassium 3.3 (L) 3.5 - 5.1 mmol/L    Chloride 104 97 - 108 mmol/L    CO2 28 21 - 32 mmol/L    Anion gap 5 5 - 15 mmol/L    Glucose 97 65 - 100 mg/dL    BUN 21 (H) 6 - 20 MG/DL    Creatinine 1.62 (H) 0.70 - 1.30 MG/DL    BUN/Creatinine ratio 13 12 - 20      GFR est AA 53 (L) >60 ml/min/1.73m2    GFR est non-AA 44 (L) >60 ml/min/1.73m2    Calcium 9.3 8.5 - 10.1 MG/DL    Bilirubin, total 0.4 0.2 - 1.0 MG/DL    ALT (SGPT) 40 12 - 78 U/L    AST (SGOT) 22 15 - 37 U/L    Alk.  phosphatase 60 45 - 117 U/L    Protein, total 7.5 6.4 - 8.2 g/dL    Albumin 3.6 3.5 - 5.0 g/dL    Globulin 3.9 2.0 - 4.0 g/dL    A-G Ratio 0.9 (L) 1.1 - 2.2     CK    Collection Time: 09/01/17  3:34 PM   Result Value Ref Range     39 - 308 U/L   TROPONIN I    Collection Time: 09/01/17  3:34 PM   Result Value Ref Range    Troponin-I, Qt. 0.47 (H) <0.05 ng/mL   D DIMER    Collection Time: 09/01/17  3:34 PM   Result Value Ref Range    D-dimer 0.19 0.00 - 0.65 mg/L FEU

## 2017-09-02 NOTE — ROUTINE PROCESS
TRANSFER - OUT REPORT:    Verbal report given to Aleks Chandler RN on Stefanie Blankenship  being transferred to [facility] for routine progression of care       Report consisted of patients Situation, Background, Assessment and   Recommendations(SBAR). Information from the following report(s) SBAR, Kardex, ED Summary and MAR was reviewed with the receiving nurse. Opportunity for questions and clarification was provided.       Patient transported with:   Monitor

## 2017-09-02 NOTE — PROGRESS NOTES
Hospitalist Progress Note    NAME: Gigi Mendieta   :  1955   MRN:  [id number]       Assessment / Plan:  Chest pain atypical pain likely form GERD  -PPI and tums prn    Possible NSTEMI hemodynamically stable   -For cath today  -Cont asa and statin. No on betablocker due to bradycardia. -cardiology following.         Sinus Bradycardia asymptomatic : Check TSH. Atenolol held. Telemetry monitoring. HTN  -stop atenolol   -hydralazine prn  -will start norvasc     JIM likely prerenal :  Continue iv fluids. Repeat bmp in am. Not on nephrotoxic medications.  -creatinine might go up due to angiogram    Hypokalemia  -stop chlorthiazide. IVF hydration overnight  -k is normal        Code Status:   Full  Surrogate Decision Maker:  No mPOA     DVT Prophylaxis:   SCD  GI Prophylaxis: not indicated      Body mass index is 38.25 kg/(m^2). Recommended Disposition: Home in 1-2 days     Subjective:     Chief Complaint / Reason for Physician Visit  He still has some heart burn type of chest pain. No sob. No cough or phlegm. No abdominal pain. Review of Systems:  Symptom Y/N Comments  Symptom Y/N Comments   Fever/Chills n   Chest Pain y    Poor Appetite    Edema n    Cough n   Abdominal Pain n    Sputum n   Joint Pain n    SOB/SURESH n   Pruritis/Rash     Nausea/vomit n   Tolerating PT/OT     Diarrhea n   Tolerating Diet     Constipation    Other       Could NOT obtain due to:      Objective:     VITALS:   Last 24hrs VS reviewed since prior progress note.  Most recent are:  Patient Vitals for the past 24 hrs:   Temp Pulse Resp BP SpO2   17 1123 98 °F (36.7 °C) (!) 49 17 135/88 98 %   17 0703 97.9 °F (36.6 °C) (!) 42 17 (!) 134/97 98 %   17 0322 98.1 °F (36.7 °C) (!) 49 18 158/88 97 %   17 2330 - (!) 52 - (!) 158/97 98 %   17 2324 - (!) 53 - (!) 168/99 98 %   17 2300 - (!) 50 - (!) 164/99 98 %   17 2256 - (!) 44 - (!) 176/102 99 %   17 2245 - (!) 46 - (!) 181/105 99 % 09/01/17 2227 - 61 20 (!) 206/116 100 %   09/01/17 2121 98.1 °F (36.7 °C) (!) 46 16 (!) 178/103 96 %   09/01/17 2030 - (!) 51 10 149/84 97 %   09/01/17 2021 - (!) 51 13 153/88 97 %   09/01/17 1926 - (!) 46 16 (!) 170/95 98 %   09/01/17 1900 - (!) 53 19 (!) 163/101 97 %   09/01/17 1849 - (!) 49 12 - 97 %   09/01/17 1848 - - - (!) 171/99 -   09/01/17 1830 - (!) 46 15 (!) 195/99 99 %   09/01/17 1730 - (!) 45 8 (!) 162/99 99 %   09/01/17 1700 - (!) 46 10 (!) 161/94 98 %   09/01/17 1632 - (!) 45 9 148/89 97 %     No intake or output data in the 24 hours ending 09/02/17 1529     PHYSICAL EXAM:  General: WD, WN. Alert, cooperative, no acute distress    EENT:  EOMI. Anicteric sclerae. MMM  Resp:  CTA bilaterally, no wheezing or rales. No accessory muscle use  CV:  Regular  rhythm,  No edema  GI:  Soft, Non distended, Non tender.  +Bowel sounds  Neurologic:  Alert and oriented X 3, normal speech,   Psych:   Good insight. Not anxious nor agitated  Skin:  No rashes.   No jaundice    Reviewed most current lab test results and cultures  YES  Reviewed most current radiology test results   YES  Review and summation of old records today    NO  Reviewed patient's current orders and MAR    YES  PMH/SH reviewed - no change compared to H&P        Current Facility-Administered Medications:     aspirin chewable tablet 81 mg, 81 mg, Oral, DAILY, Rohan Euceda MD, 81 mg at 09/02/17 0917    atorvastatin (LIPITOR) tablet 40 mg, 40 mg, Oral, DAILY, Ashley Mi MD, 40 mg at 09/02/17 1126    0.9% sodium chloride infusion, 100 mL/hr, IntraVENous, CONTINUOUS, Eliu Mahmood MD, Last Rate: 100 mL/hr at 09/01/17 2013, 100 mL/hr at 09/01/17 2013    sodium chloride (NS) flush 5-10 mL, 5-10 mL, IntraVENous, Q8H, Ramandeep Blanton MD    sodium chloride (NS) flush 5-10 mL, 5-10 mL, IntraVENous, PRN, Ramandeep Blanton MD, 10 mL at 09/01/17 2052    acetaminophen (TYLENOL) tablet 650 mg, 650 mg, Oral, Q4H PRN, Ramandeep Blanton MD, 650 mg at 09/01/17 2229    melatonin tablet 3 mg, 3 mg, Oral, QHS PRN, Kieran Sales MD, 3 mg at 09/01/17 2229    pantoprazole (PROTONIX) 40 mg in sodium chloride 0.9 % 10 mL injection, 40 mg, IntraVENous, DAILY, Kieran Sales MD, 40 mg at 09/02/17 9609    calcium carbonate (TUMS) chewable tablet 200 mg [elemental], 200 mg, Oral, Q4H PRN, Kieran Sales MD    nitroglycerin (NITROBID) 2 % ointment 1 Inch, 1 Inch, Topical, Q6H, Kieran Sales MD, 1 Inch at 09/02/17 1126    morphine injection 2 mg, 2 mg, IntraVENous, Q2H PRN, Lion Culver MD    ________________________________________________________________________  Care Plan discussed with:    Comments   Patient y    Family      RN y    Care Manager     Consultant                        Multidiciplinary team rounds were held today with , nursing, pharmacist and clinical coordinator. Patient's plan of care was discussed; medications were reviewed and discharge planning was addressed. ________________________________________________________________________  Total NON critical care TIME:  35  Minutes    Total CRITICAL CARE TIME Spent:   Minutes non procedure based      Comments   >50% of visit spent in counseling and coordination of care     ________________________________________________________________________  Mateo Carmona MD     Procedures: see electronic medical records for all procedures/Xrays and details which were not copied into this note but were reviewed prior to creation of Plan. LABS:  I reviewed today's most current labs and imaging studies.   Pertinent labs include:  Recent Labs      09/01/17   1534   WBC  8.8   HGB  17.1*   HCT  48.6   PLT  273     Recent Labs      09/02/17   0327  09/01/17   1534   NA  139  137   K  3.7  3.3*   CL  107  104   CO2  24  28   GLU  93  97   BUN  21*  21*   CREA  1.51*  1.62*   CA  8.4*  9.3   MG  2.0   --    ALB  3.1*  3.6   TBILI  0.4  0.4   SGOT  21  22   ALT  33  40   INR  1.1   --        Signed: Randa Mcleod MD

## 2017-09-02 NOTE — ED NOTES
Patient transported to 2159 by stretcher with monitor with nurse Timur, [address] [facility] EDT

## 2017-09-02 NOTE — PROGRESS NOTES
Cardiology Progress Note            [address], [address] [facility]  [phone]    9/2/2017 4:28 AM    Admit Date: 9/1/2017    Admit Diagnosis: Chest pain    Subjective:     Antonio Newell   Had some more cp overnight - relieved with sl nitro.     Visit Vitals    BP (!) 158/97    Pulse (!) 52    Temp 98.1 °F (36.7 °C)    Resp 20    Ht 5' 9\" (1.753 m)    Wt 259 lb (117.5 kg)    SpO2 98%    BMI 38.25 kg/m2     Current Facility-Administered Medications   Medication Dose Route Frequency    0.9% sodium chloride infusion  100 mL/hr IntraVENous CONTINUOUS    sodium chloride (NS) flush 5-10 mL  5-10 mL IntraVENous Q8H    sodium chloride (NS) flush 5-10 mL  5-10 mL IntraVENous PRN    acetaminophen (TYLENOL) tablet 650 mg  650 mg Oral Q4H PRN    melatonin tablet 3 mg  3 mg Oral QHS PRN    pantoprazole (PROTONIX) 40 mg in sodium chloride 0.9 % 10 mL injection  40 mg IntraVENous DAILY    calcium carbonate (TUMS) chewable tablet 200 mg [elemental]  200 mg Oral Q4H PRN    nitroglycerin (NITROBID) 2 % ointment 1 Inch  1 Inch Topical Q6H    morphine injection 2 mg  2 mg IntraVENous Q2H PRN         Objective:      Visit Vitals    BP (!) 158/97    Pulse (!) 52    Temp 98.1 °F (36.7 °C)    Resp 20    Ht 5' 9\" (1.753 m)    Wt 259 lb (117.5 kg)    SpO2 98%    BMI 38.25 kg/m2       Physical Exam:  Abdomen: soft, non-tender  Extremities: extremities normal  Heart: regular rate and rhythm  Lungs: clear to auscultation bilaterally  Pulses: 2+ and symmetric    Data Review:   Labs:    Recent Labs      09/01/17   1534   WBC  8.8   HGB  17.1*   HCT  48.6   PLT  273     Recent Labs      09/02/17   0327  09/01/17   1534   NA  139  137   K  3.7  3.3*   CL  107  104   CO2  24  28   GLU  93  97   BUN  21*  21*   CREA  1.51*  1.62*   CA  8.4*  9.3   MG  2.0   --    ALB  3.1*  3.6   TBILI  0.4  0.4   SGOT  21  22   ALT  33  40   INR  1.1   --        Recent Labs      09/02/17   0327  09/01/17   1534   MICHELLE 0.66*  0.47*   CPK   --   153       No intake or output data in the 24 hours ending 09/02/17 0428     Telemetry: teresita fish    Assessment:     Active Problems:    ARF (acute renal failure) ([address] (9/1/2017)      Essential hypertension (9/1/2017)      Troponin I above reference range (9/1/2017)      Chest pain (9/1/2017)        Plan:     Paul Ovalle is having intermittent chest pain relieved with nitro. His cr has improved with hydration and his trop has gone up. Will proceed with cath this am. Npo.  Dr Lluvia Broussard will perform    Lion Culver MD, [facility]    9/2/2017

## 2017-09-02 NOTE — PROGRESS NOTES
Patient remains NPO. Groin/radial prepped for cardiac cath. No s/s of distress. VSS. Family remains at bedside. Consents signed and placed on chart.

## 2017-09-02 NOTE — CARDIO/PULMONARY
C/P REHAB:  Chart reviewed. Admitted with chest pain and elevated troponin, for cardiac cath today. Also found to have acute renal failure on admission. PMH significant for HTN and high cholesterol. 2D echo pending. Nonsmoker. Will continue to follow for education and support as indicated.

## 2017-09-03 LAB
ANION GAP SERPL CALC-SCNC: 7 MMOL/L (ref 5–15)
BUN SERPL-MCNC: 18 MG/DL (ref 6–20)
BUN/CREAT SERPL: 13 (ref 12–20)
CALCIUM SERPL-MCNC: 8.1 MG/DL (ref 8.5–10.1)
CHLORIDE SERPL-SCNC: 109 MMOL/L (ref 97–108)
CO2 SERPL-SCNC: 25 MMOL/L (ref 21–32)
CREAT SERPL-MCNC: 1.42 MG/DL (ref 0.7–1.3)
ERYTHROCYTE [DISTWIDTH] IN BLOOD BY AUTOMATED COUNT: 13.7 % (ref 11.5–14.5)
GLUCOSE SERPL-MCNC: 95 MG/DL (ref 65–100)
HCT VFR BLD AUTO: 43.9 % (ref 36.6–50.3)
HGB BLD-MCNC: 15.1 G/DL (ref 12.1–17)
MCH RBC QN AUTO: 29.5 PG (ref 26–34)
MCHC RBC AUTO-ENTMCNC: 34.4 G/DL (ref 30–36.5)
MCV RBC AUTO: 85.9 FL (ref 80–99)
PLATELET # BLD AUTO: 233 K/UL (ref 150–400)
POTASSIUM SERPL-SCNC: 3.5 MMOL/L (ref 3.5–5.1)
RBC # BLD AUTO: 5.11 M/UL (ref 4.1–5.7)
SODIUM SERPL-SCNC: 141 MMOL/L (ref 136–145)
TSH SERPL DL<=0.05 MIU/L-ACNC: 3.11 UIU/ML (ref 0.36–3.74)
WBC # BLD AUTO: 8.2 K/UL (ref 4.1–11.1)

## 2017-09-03 PROCEDURE — 027034Z DILATION OF CORONARY ARTERY, ONE ARTERY WITH DRUG-ELUTING INTRALUMINAL DEVICE, PERCUTANEOUS APPROACH: ICD-10-PCS | Performed by: INTERNAL MEDICINE

## 2017-09-03 PROCEDURE — 74011250636 HC RX REV CODE- 250/636: Performed by: INTERNAL MEDICINE

## 2017-09-03 PROCEDURE — 80048 BASIC METABOLIC PNL TOTAL CA: CPT | Performed by: INTERNAL MEDICINE

## 2017-09-03 PROCEDURE — C1894 INTRO/SHEATH, NON-LASER: HCPCS

## 2017-09-03 PROCEDURE — C1887 CATHETER, GUIDING: HCPCS

## 2017-09-03 PROCEDURE — 85027 COMPLETE CBC AUTOMATED: CPT | Performed by: INTERNAL MEDICINE

## 2017-09-03 PROCEDURE — C1769 GUIDE WIRE: HCPCS

## 2017-09-03 PROCEDURE — 77030004549 HC CATH ANGI DX PRF MRTM -A

## 2017-09-03 PROCEDURE — 74011250637 HC RX REV CODE- 250/637: Performed by: INTERNAL MEDICINE

## 2017-09-03 PROCEDURE — 4A033BC MEASUREMENT OF ARTERIAL PRESSURE, CORONARY, PERCUTANEOUS APPROACH: ICD-10-PCS | Performed by: INTERNAL MEDICINE

## 2017-09-03 PROCEDURE — 77030019698 HC SYR ANGI MDLON MRTM -A

## 2017-09-03 PROCEDURE — 36415 COLL VENOUS BLD VENIPUNCTURE: CPT | Performed by: INTERNAL MEDICINE

## 2017-09-03 PROCEDURE — 77030015766

## 2017-09-03 PROCEDURE — 4A023N7 MEASUREMENT OF CARDIAC SAMPLING AND PRESSURE, LEFT HEART, PERCUTANEOUS APPROACH: ICD-10-PCS | Performed by: INTERNAL MEDICINE

## 2017-09-03 PROCEDURE — 99153 MOD SED SAME PHYS/QHP EA: CPT

## 2017-09-03 PROCEDURE — 84443 ASSAY THYROID STIM HORMONE: CPT | Performed by: INTERNAL MEDICINE

## 2017-09-03 PROCEDURE — 77030019697 HC SYR ANGI INFL MRTM -B

## 2017-09-03 PROCEDURE — 77030019569 HC BND COMPR RAD TERU -B

## 2017-09-03 PROCEDURE — B2111ZZ FLUOROSCOPY OF MULTIPLE CORONARY ARTERIES USING LOW OSMOLAR CONTRAST: ICD-10-PCS | Performed by: INTERNAL MEDICINE

## 2017-09-03 PROCEDURE — 74011000258 HC RX REV CODE- 258: Performed by: INTERNAL MEDICINE

## 2017-09-03 PROCEDURE — C1874 STENT, COATED/COV W/DEL SYS: HCPCS

## 2017-09-03 PROCEDURE — 65660000000 HC RM CCU STEPDOWN

## 2017-09-03 PROCEDURE — 74011000250 HC RX REV CODE- 250

## 2017-09-03 PROCEDURE — C1725 CATH, TRANSLUMIN NON-LASER: HCPCS

## 2017-09-03 PROCEDURE — 74011250637 HC RX REV CODE- 250/637

## 2017-09-03 PROCEDURE — B2151ZZ FLUOROSCOPY OF LEFT HEART USING LOW OSMOLAR CONTRAST: ICD-10-PCS | Performed by: INTERNAL MEDICINE

## 2017-09-03 PROCEDURE — 74011636320 HC RX REV CODE- 636/320

## 2017-09-03 PROCEDURE — 77030008543 HC TBNG MON PRSS MRTM -A

## 2017-09-03 PROCEDURE — 74011250636 HC RX REV CODE- 250/636

## 2017-09-03 PROCEDURE — 77030010221 HC SPLNT WR POS TELE -B

## 2017-09-03 RX ORDER — ONDANSETRON 2 MG/ML
4 INJECTION INTRAMUSCULAR; INTRAVENOUS
Status: DISCONTINUED | OUTPATIENT
Start: 2017-09-03 | End: 2017-09-04 | Stop reason: HOSPADM

## 2017-09-03 RX ORDER — HEPARIN SODIUM 1000 [USP'U]/ML
2500 INJECTION, SOLUTION INTRAVENOUS; SUBCUTANEOUS ONCE
Status: COMPLETED | OUTPATIENT
Start: 2017-09-03 | End: 2017-09-03

## 2017-09-03 RX ORDER — HYDRALAZINE HYDROCHLORIDE 20 MG/ML
10 INJECTION INTRAMUSCULAR; INTRAVENOUS
Status: DISCONTINUED | OUTPATIENT
Start: 2017-09-03 | End: 2017-09-04 | Stop reason: HOSPADM

## 2017-09-03 RX ORDER — VERAPAMIL HYDROCHLORIDE 2.5 MG/ML
INJECTION, SOLUTION INTRAVENOUS
Status: COMPLETED
Start: 2017-09-03 | End: 2017-09-03

## 2017-09-03 RX ORDER — FENTANYL CITRATE 50 UG/ML
INJECTION, SOLUTION INTRAMUSCULAR; INTRAVENOUS
Status: COMPLETED
Start: 2017-09-03 | End: 2017-09-03

## 2017-09-03 RX ORDER — PANTOPRAZOLE SODIUM 40 MG/1
40 TABLET, DELAYED RELEASE ORAL DAILY
Status: DISCONTINUED | OUTPATIENT
Start: 2017-09-03 | End: 2017-09-04 | Stop reason: HOSPADM

## 2017-09-03 RX ORDER — LIDOCAINE HYDROCHLORIDE 10 MG/ML
INJECTION, SOLUTION EPIDURAL; INFILTRATION; INTRACAUDAL; PERINEURAL
Status: COMPLETED
Start: 2017-09-03 | End: 2017-09-03

## 2017-09-03 RX ORDER — MIDAZOLAM HYDROCHLORIDE 1 MG/ML
.5-2 INJECTION, SOLUTION INTRAMUSCULAR; INTRAVENOUS
Status: DISCONTINUED | OUTPATIENT
Start: 2017-09-03 | End: 2017-09-03

## 2017-09-03 RX ORDER — IODIXANOL 320 MG/ML
INJECTION, SOLUTION INTRAVASCULAR
Status: COMPLETED
Start: 2017-09-03 | End: 2017-09-03

## 2017-09-03 RX ORDER — SODIUM CHLORIDE 9 MG/ML
INJECTION, SOLUTION INTRAVENOUS
Status: DISCONTINUED
Start: 2017-09-03 | End: 2017-09-04 | Stop reason: HOSPADM

## 2017-09-03 RX ORDER — ADENOSINE 3 MG/ML
INJECTION, SOLUTION INTRAVENOUS
Status: DISCONTINUED
Start: 2017-09-03 | End: 2017-09-04 | Stop reason: HOSPADM

## 2017-09-03 RX ORDER — HEPARIN SODIUM 1000 [USP'U]/ML
INJECTION, SOLUTION INTRAVENOUS; SUBCUTANEOUS
Status: COMPLETED
Start: 2017-09-03 | End: 2017-09-03

## 2017-09-03 RX ORDER — MIDAZOLAM HYDROCHLORIDE 1 MG/ML
INJECTION, SOLUTION INTRAMUSCULAR; INTRAVENOUS
Status: COMPLETED
Start: 2017-09-03 | End: 2017-09-03

## 2017-09-03 RX ORDER — LORAZEPAM 2 MG/ML
1 INJECTION INTRAMUSCULAR ONCE
Status: COMPLETED | OUTPATIENT
Start: 2017-09-03 | End: 2017-09-03

## 2017-09-03 RX ORDER — BIVALIRUDIN 250 MG/5ML
INJECTION, POWDER, LYOPHILIZED, FOR SOLUTION INTRAVENOUS
Status: DISCONTINUED
Start: 2017-09-03 | End: 2017-09-04 | Stop reason: HOSPADM

## 2017-09-03 RX ORDER — LIDOCAINE HYDROCHLORIDE 10 MG/ML
1-30 INJECTION, SOLUTION EPIDURAL; INFILTRATION; INTRACAUDAL; PERINEURAL
Status: DISCONTINUED | OUTPATIENT
Start: 2017-09-03 | End: 2017-09-03

## 2017-09-03 RX ORDER — HEPARIN SODIUM 200 [USP'U]/100ML
500 INJECTION, SOLUTION INTRAVENOUS ONCE
Status: COMPLETED | OUTPATIENT
Start: 2017-09-03 | End: 2017-09-03

## 2017-09-03 RX ORDER — ISOSORBIDE MONONITRATE 30 MG/1
30 TABLET, EXTENDED RELEASE ORAL DAILY
Status: DISCONTINUED | OUTPATIENT
Start: 2017-09-03 | End: 2017-09-04 | Stop reason: HOSPADM

## 2017-09-03 RX ORDER — FENTANYL CITRATE 50 UG/ML
25-50 INJECTION, SOLUTION INTRAMUSCULAR; INTRAVENOUS
Status: DISCONTINUED | OUTPATIENT
Start: 2017-09-03 | End: 2017-09-03

## 2017-09-03 RX ORDER — IODIXANOL 320 MG/ML
INJECTION, SOLUTION INTRAVASCULAR
Status: DISCONTINUED
Start: 2017-09-03 | End: 2017-09-03

## 2017-09-03 RX ORDER — VERAPAMIL HYDROCHLORIDE 2.5 MG/ML
2.5 INJECTION, SOLUTION INTRAVENOUS ONCE
Status: COMPLETED | OUTPATIENT
Start: 2017-09-03 | End: 2017-09-03

## 2017-09-03 RX ORDER — IODIXANOL 320 MG/ML
0-100 INJECTION, SOLUTION INTRAVASCULAR
Status: DISCONTINUED | OUTPATIENT
Start: 2017-09-03 | End: 2017-09-03

## 2017-09-03 RX ORDER — SODIUM CHLORIDE 9 MG/ML
75 INJECTION, SOLUTION INTRAVENOUS CONTINUOUS
Status: DISPENSED | OUTPATIENT
Start: 2017-09-03 | End: 2017-09-03

## 2017-09-03 RX ORDER — HEPARIN SODIUM 200 [USP'U]/100ML
INJECTION, SOLUTION INTRAVENOUS
Status: COMPLETED
Start: 2017-09-03 | End: 2017-09-03

## 2017-09-03 RX ADMIN — Medication 10 ML: at 19:26

## 2017-09-03 RX ADMIN — Medication 5 ML: at 14:00

## 2017-09-03 RX ADMIN — MIDAZOLAM HYDROCHLORIDE 1 MG: 1 INJECTION, SOLUTION INTRAMUSCULAR; INTRAVENOUS at 10:29

## 2017-09-03 RX ADMIN — MORPHINE SULFATE 2 MG: 4 INJECTION, SOLUTION INTRAMUSCULAR; INTRAVENOUS at 08:12

## 2017-09-03 RX ADMIN — Medication 10 ML: at 02:20

## 2017-09-03 RX ADMIN — AMLODIPINE BESYLATE 10 MG: 5 TABLET ORAL at 08:13

## 2017-09-03 RX ADMIN — ONDANSETRON HYDROCHLORIDE 4 MG: 2 INJECTION, SOLUTION INTRAMUSCULAR; INTRAVENOUS at 19:28

## 2017-09-03 RX ADMIN — BIVALIRUDIN 1.75 MG/KG/HR: 250 INJECTION, POWDER, LYOPHILIZED, FOR SOLUTION INTRAVENOUS at 10:49

## 2017-09-03 RX ADMIN — IODIXANOL 80 ML: 320 INJECTION, SOLUTION INTRAVASCULAR at 10:40

## 2017-09-03 RX ADMIN — HEPARIN SODIUM 1000 UNITS: 200 INJECTION, SOLUTION INTRAVENOUS at 09:56

## 2017-09-03 RX ADMIN — MIDAZOLAM HYDROCHLORIDE 1 MG: 1 INJECTION, SOLUTION INTRAMUSCULAR; INTRAVENOUS at 10:08

## 2017-09-03 RX ADMIN — NITROGLYCERIN 200 MCG: 5 INJECTION, SOLUTION INTRAVENOUS at 11:06

## 2017-09-03 RX ADMIN — TICAGRELOR 180 MG: 90 TABLET ORAL at 10:24

## 2017-09-03 RX ADMIN — LIDOCAINE HYDROCHLORIDE 1 ML: 10 INJECTION, SOLUTION EPIDURAL; INFILTRATION; INTRACAUDAL; PERINEURAL at 10:08

## 2017-09-03 RX ADMIN — TICAGRELOR 90 MG: 90 TABLET ORAL at 22:04

## 2017-09-03 RX ADMIN — ATORVASTATIN CALCIUM 40 MG: 40 TABLET, FILM COATED ORAL at 08:14

## 2017-09-03 RX ADMIN — ACETAMINOPHEN 650 MG: 325 TABLET ORAL at 12:08

## 2017-09-03 RX ADMIN — MIDAZOLAM HYDROCHLORIDE 1 MG: 1 INJECTION, SOLUTION INTRAMUSCULAR; INTRAVENOUS at 09:52

## 2017-09-03 RX ADMIN — FENTANYL CITRATE 25 MCG: 50 INJECTION, SOLUTION INTRAMUSCULAR; INTRAVENOUS at 11:10

## 2017-09-03 RX ADMIN — MIDAZOLAM HYDROCHLORIDE 1 MG: 1 INJECTION, SOLUTION INTRAMUSCULAR; INTRAVENOUS at 10:46

## 2017-09-03 RX ADMIN — FENTANYL CITRATE 25 MCG: 50 INJECTION, SOLUTION INTRAMUSCULAR; INTRAVENOUS at 10:46

## 2017-09-03 RX ADMIN — MIDAZOLAM HYDROCHLORIDE 1 MG: 1 INJECTION INTRAMUSCULAR; INTRAVENOUS at 09:52

## 2017-09-03 RX ADMIN — FENTANYL CITRATE 50 MCG: 50 INJECTION, SOLUTION INTRAMUSCULAR; INTRAVENOUS at 09:52

## 2017-09-03 RX ADMIN — MIDAZOLAM HYDROCHLORIDE 1 MG: 1 INJECTION, SOLUTION INTRAMUSCULAR; INTRAVENOUS at 09:59

## 2017-09-03 RX ADMIN — HYDRALAZINE HYDROCHLORIDE 10 MG: 20 INJECTION INTRAMUSCULAR; INTRAVENOUS at 19:26

## 2017-09-03 RX ADMIN — MORPHINE SULFATE 2 MG: 4 INJECTION, SOLUTION INTRAMUSCULAR; INTRAVENOUS at 13:05

## 2017-09-03 RX ADMIN — ADENOSINE 140 MCG/KG/MIN: 3 SOLUTION INTRAVENOUS at 10:58

## 2017-09-03 RX ADMIN — SODIUM CHLORIDE 75 ML/HR: 900 INJECTION, SOLUTION INTRAVENOUS at 13:09

## 2017-09-03 RX ADMIN — IODIXANOL 80 ML: 320 INJECTION, SOLUTION INTRAVASCULAR at 10:28

## 2017-09-03 RX ADMIN — NITROGLYCERIN 1 INCH: 20 OINTMENT TOPICAL at 06:53

## 2017-09-03 RX ADMIN — HEPARIN SODIUM 2500 UNITS: 1000 INJECTION, SOLUTION INTRAVENOUS; SUBCUTANEOUS at 10:10

## 2017-09-03 RX ADMIN — VERAPAMIL HYDROCHLORIDE 2.5 MG: 2.5 INJECTION INTRAVENOUS at 10:09

## 2017-09-03 RX ADMIN — ASPIRIN 81 MG 81 MG: 81 TABLET ORAL at 08:13

## 2017-09-03 RX ADMIN — VERAPAMIL HYDROCHLORIDE 2.5 MG: 2.5 INJECTION, SOLUTION INTRAVENOUS at 10:09

## 2017-09-03 RX ADMIN — PANTOPRAZOLE SODIUM 40 MG: 40 TABLET, DELAYED RELEASE ORAL at 08:13

## 2017-09-03 RX ADMIN — ACETAMINOPHEN 650 MG: 325 TABLET ORAL at 02:11

## 2017-09-03 RX ADMIN — BIVALIRUDIN 1.75 MG/KG/HR: 250 INJECTION, POWDER, LYOPHILIZED, FOR SOLUTION INTRAVENOUS at 10:24

## 2017-09-03 RX ADMIN — LORAZEPAM 1 MG: 2 INJECTION INTRAMUSCULAR; INTRAVENOUS at 21:07

## 2017-09-03 RX ADMIN — IODIXANOL 90 ML: 320 INJECTION, SOLUTION INTRAVASCULAR at 11:06

## 2017-09-03 RX ADMIN — MORPHINE SULFATE 2 MG: 4 INJECTION, SOLUTION INTRAMUSCULAR; INTRAVENOUS at 02:32

## 2017-09-03 RX ADMIN — HEPARIN SODIUM 1000 UNITS: 200 INJECTION, SOLUTION INTRAVENOUS at 09:57

## 2017-09-03 RX ADMIN — ISOSORBIDE MONONITRATE 30 MG: 30 TABLET, EXTENDED RELEASE ORAL at 18:04

## 2017-09-03 RX ADMIN — NITROGLYCERIN 200 MCG: 5 INJECTION, SOLUTION INTRAVENOUS at 10:10

## 2017-09-03 NOTE — PROGRESS NOTES
Hospitalist Progress Note    NAME: Uzair Orona   :  1955   MRN:  [id number]       Assessment / Plan:  Chest pain atypical pain likely form GERD  -PPI and tums prn    Possible NSTEMI hemodynamically stable s/p stenting   -Waiting on final operative note.   -Cont asa, Brilinta and statin. Not on betablocker due to bradycardia. -cardiology following. Sinus Bradycardia asymptomatic : TSH is normal. Atenolol held. Telemetry monitoring. HTN  -stop atenolol   -hydralazine prn  -Continue  Norvasc and will add imdur     JIM likely prerenal :  Continue iv fluids. Repeat bmp in am. Not on nephrotoxic medications.  -creatinine might go up due to contrast    Hypokalemia  -stop Chlorthiazide due to jim  -k is normal        Code Status:   Full  Surrogate Decision Maker:  No mPOA     DVT Prophylaxis:   SCD  GI Prophylaxis: not indicated      Body mass index is 38.25 kg/(m^2). Recommended Disposition: In am tomorrow. Subjective:     Chief Complaint / Reason for Physician Visit  Feels better. Had angiogram with stenting today. Blood pressure is still high. Review of Systems:  Symptom Y/N Comments  Symptom Y/N Comments   Fever/Chills n   Chest Pain y    Poor Appetite    Edema n    Cough n   Abdominal Pain n    Sputum n   Joint Pain n    SOB/SURESH n   Pruritis/Rash     Nausea/vomit n   Tolerating PT/OT     Diarrhea n   Tolerating Diet     Constipation    Other       Could NOT obtain due to:      Objective:     VITALS:   Last 24hrs VS reviewed since prior progress note.  Most recent are:  Patient Vitals for the past 24 hrs:   Temp Pulse Resp BP SpO2   17 1125 97.5 °F (36.4 °C) (!) 56 16 (!) 148/91 94 %   17 0649 97.7 °F (36.5 °C) (!) 44 14 142/90 99 %   17 0211 97.6 °F (36.4 °C) (!) 49 16 (!) 150/92 100 %   17 2307 97.9 °F (36.6 °C) (!) 50 18 (!) 151/92 98 %   17 1920 - (!) 49 14 - -   17 1801 98.2 °F (36.8 °C) (!) 52 16 160/87 98 %   17 1544 97.8 °F (36.6 °C) (!) 49 16 (!) 145/93 97 %       Intake/Output Summary (Last 24 hours) at 09/03/17 1211  Last data filed at 09/03/17 0017   Gross per 24 hour   Intake          2886.67 ml   Output             1600 ml   Net          1286.67 ml        PHYSICAL EXAM:  General: WD, WN. Alert, cooperative, no acute distress    EENT:  EOMI. Anicteric sclerae. MMM  Resp:  CTA bilaterally, no wheezing or rales. No accessory muscle use  CV:  Regular  rhythm,  No edema  GI:  Soft, Non distended, Non tender.  +Bowel sounds  Neurologic:  Alert and oriented X 3, normal speech,   Psych:   Good insight. Not anxious nor agitated  Skin:  No rashes.   No jaundice    Reviewed most current lab test results and cultures  YES  Reviewed most current radiology test results   YES  Review and summation of old records today    NO  Reviewed patient's current orders and MAR    YES  PMH/SH reviewed - no change compared to H&P        Current Facility-Administered Medications:     pantoprazole (PROTONIX) tablet 40 mg, 40 mg, Oral, DAILY, Rohan Tripp Wilkins MD, 40 mg at 09/03/17 0813    bivalirudin (ANGIOMAX) 250 mg injection, , , ,     0.9% sodium chloride infusion, , , ,     adenosine (ADENOSCAN) 3 mg/mL injection, , , ,     ADDaptor, , , ,     bivalirudin (ANGIOMAX) 250 mg injection, , , ,     0.9% sodium chloride infusion, , , ,     ADDaptor, , , ,     bivalirudin (ANGIOMAX) 250 mg in 0.9% sodium chloride (MBP/ADV) 50 mL, 0.5 mg/kg/hr, IntraVENous, CONTINUOUS, Belinda Morataya MD, Last Rate: 11.8 mL/hr at 09/03/17 1111, 0.5 mg/kg/hr at 09/03/17 1111    ticagrelor (BRILINTA) tablet 90 mg, 90 mg, Oral, Q12H, Belinda Morataya MD    0.9% sodium chloride infusion, 75 mL/hr, IntraVENous, CONTINUOUS, Belinda Morataya MD    isosorbide mononitrate ER (IMDUR) tablet 30 mg, 30 mg, Oral, DAILY, Adolph Aguillon MD    aspirin chewable tablet 81 mg, 81 mg, Oral, DAILY, Fabiana Barrett MD, 81 mg at 09/03/17 0813    atorvastatin (LIPITOR) tablet 40 mg, 40 mg, Oral, DAILY, Adolph Aguillon MD, 40 mg at 09/03/17 0814    amLODIPine (NORVASC) tablet 10 mg, 10 mg, Oral, DAILY, Adolph Aguillon MD, 10 mg at 09/03/17 0813    sodium chloride (NS) flush 5-10 mL, 5-10 mL, IntraVENous, Q8H, Ranjith Arellano MD, 10 mL at 09/03/17 0220    sodium chloride (NS) flush 5-10 mL, 5-10 mL, IntraVENous, PRN, Ranjith Arellano MD, 10 mL at 09/01/17 2052    acetaminophen (TYLENOL) tablet 650 mg, 650 mg, Oral, Q4H PRN, Ranjith Arellano MD, 650 mg at 09/03/17 1208    melatonin tablet 3 mg, 3 mg, Oral, QHS PRN, Ranjith Arellano MD, 3 mg at 09/02/17 2308    calcium carbonate (TUMS) chewable tablet 200 mg [elemental], 200 mg, Oral, Q4H PRN, Ranjith Arellano MD, 200 mg at 09/02/17 2309    morphine injection 2 mg, 2 mg, IntraVENous, Q2H PRN, Fabiana Barrett MD, 2 mg at 09/03/17 8217    ________________________________________________________________________  Care Plan discussed with:    Comments   Patient y    Family      RN y    Care Manager     Consultant                        Multidiciplinary team rounds were held today with , nursing, pharmacist and clinical coordinator. Patient's plan of care was discussed; medications were reviewed and discharge planning was addressed. ________________________________________________________________________  Total NON critical care TIME:  35  Minutes    Total CRITICAL CARE TIME Spent:   Minutes non procedure based      Comments   >50% of visit spent in counseling and coordination of care     ________________________________________________________________________  Adolph Aguillon MD     Procedures: see electronic medical records for all procedures/Xrays and details which were not copied into this note but were reviewed prior to creation of Plan. LABS:  I reviewed today's most current labs and imaging studies.   Pertinent labs include:  Recent Labs      09/03/17   0221  09/01/17   1534   WBC  8.2  8.8   HGB  15.1  17.1*   HCT 43.9  48.6   PLT  233  273     Recent Labs      09/03/17   0221  09/02/17   0327  09/01/17   1534   NA  141  139  137   K  3.5  3.7  3.3*   CL  109*  107  104   CO2  25  24  28   GLU  95  93  97   BUN  18  21*  21*   CREA  1.42*  1.51*  1.62*   CA  8.1*  8.4*  9.3   MG   --   2.0   --    ALB   --   3.1*  3.6   TBILI   --   0.4  0.4   SGOT   --   21  22   ALT   --   33  40   INR   --   1.1   --        Signed: Aliyah Tovar MD

## 2017-09-03 NOTE — PROGRESS NOTES
9/3/2017 11:12 AM    Admit Date: 9/1/2017    Admit Diagnosis: Chest pain    Subjective:     Gigi Mendieta reports chest pain, chest pressure/discomfort.     Visit Vitals    /90    Pulse (!) 44    Temp 97.7 °F (36.5 °C)    Resp 14    Ht 5' 9\" (1.753 m)    Wt 259 lb (117.5 kg)    SpO2 99%    BMI 38.25 kg/m2     Current Facility-Administered Medications   Medication Dose Route Frequency    pantoprazole (PROTONIX) tablet 40 mg  40 mg Oral DAILY    fentaNYL citrate (PF) injection 25-50 mcg  25-50 mcg IntraVENous Multiple    lidocaine (PF) (XYLOCAINE) 10 mg/mL (1 %) injection 1-30 mL  1-30 mL IntraDERMal Multiple    midazolam (VERSED) injection 0.5-2 mg  0.5-2 mg IntraVENous Multiple    nitroglycerin 100 mcg/ml compounded injection  0-10 mL IntraCORONary Multiple    bivalirudin (ANGIOMAX) 250 mg injection        0.9% sodium chloride infusion        adenosine (ADENOSCAN) 3 mg/mL injection        ADDaptor        iodixanol (VISIPAQUE) 320 mg iodine/mL contrast injection 0-100 mL  0-100 mL IntraarTERial Multiple    bivalirudin (ANGIOMAX) 250 mg injection        0.9% sodium chloride infusion        ADDaptor        adenosine (ADENOSCAN) 180 mg in 0.9% sodium chloride 30 mL infusion  140-180 mcg/kg/min IntraVENous TITRATE    iodixanol (VISIPAQUE) 320 mg iodine/mL contrast injection        bivalirudin (ANGIOMAX) 250 mg in 0.9% sodium chloride (MBP/ADV) 50 mL  0.5 mg/kg/hr IntraVENous CONTINUOUS    ticagrelor (BRILINTA) tablet 90 mg  90 mg Oral Q12H    aspirin chewable tablet 81 mg  81 mg Oral DAILY    atorvastatin (LIPITOR) tablet 40 mg  40 mg Oral DAILY    amLODIPine (NORVASC) tablet 10 mg  10 mg Oral DAILY    0.9% sodium chloride infusion  100 mL/hr IntraVENous CONTINUOUS    sodium chloride (NS) flush 5-10 mL  5-10 mL IntraVENous Q8H    sodium chloride (NS) flush 5-10 mL  5-10 mL IntraVENous PRN    acetaminophen (TYLENOL) tablet 650 mg  650 mg Oral Q4H PRN    melatonin tablet 3 mg  3 mg Oral QHS PRN    calcium carbonate (TUMS) chewable tablet 200 mg [elemental]  200 mg Oral Q4H PRN    nitroglycerin (NITROBID) 2 % ointment 1 Inch  1 Inch Topical Q6H    morphine injection 2 mg  2 mg IntraVENous Q2H PRN         Objective:      Visit Vitals    /90    Pulse (!) 44    Temp 97.7 °F (36.5 °C)    Resp 14    Ht 5' 9\" (1.753 m)    Wt 259 lb (117.5 kg)    SpO2 99%    BMI 38.25 kg/m2       Physical Exam:  Abdomen: soft, non-tender. Bowel sounds normal.   Extremities: no cyanosis or edema  Heart: regular rate and rhythm, S1, S2 normal, no murmur, click, rub or gallop  Lungs: clear to auscultation bilaterally  Neurologic: Grossly normal    Data Review:   Labs:    Recent Results (from the past 24 hour(s))   METABOLIC PANEL, BASIC    Collection Time: 09/03/17  2:21 AM   Result Value Ref Range    Sodium 141 136 - 145 mmol/L    Potassium 3.5 3.5 - 5.1 mmol/L    Chloride 109 (H) 97 - 108 mmol/L    CO2 25 21 - 32 mmol/L    Anion gap 7 5 - 15 mmol/L    Glucose 95 65 - 100 mg/dL    BUN 18 6 - 20 MG/DL    Creatinine 1.42 (H) 0.70 - 1.30 MG/DL    BUN/Creatinine ratio 13 12 - 20      GFR est AA >60 >60 ml/min/1.73m2    GFR est non-AA 51 (L) >60 ml/min/1.73m2    Calcium 8.1 (L) 8.5 - 10.1 MG/DL   CBC W/O DIFF    Collection Time: 09/03/17  2:21 AM   Result Value Ref Range    WBC 8.2 4.1 - 11.1 K/uL    RBC 5.11 4.10 - 5.70 M/uL    HGB 15.1 12.1 - 17.0 g/dL    HCT 43.9 36.6 - 50.3 %    MCV 85.9 80.0 - 99.0 FL    MCH 29.5 26.0 - 34.0 PG    MCHC 34.4 30.0 - 36.5 g/dL    RDW 13.7 11.5 - 14.5 %    PLATELET 440 931 - 376 K/uL   TSH 3RD GENERATION    Collection Time: 09/03/17  2:21 AM   Result Value Ref Range    TSH 3.11 0.36 - 3.74 uIU/mL       Telemetry: normal sinus rhythm      Assessment:     Active Problems:    ARF (acute renal failure) ([facility]) (9/1/2017)      Essential hypertension (9/1/2017)      Troponin I above reference range (9/1/2017)      Chest pain (9/1/2017)        Plan:     1.  NSTEMI: s/p PLB stent and normal FFR of LAD and RI. No BB due to bradycardia. Continue aspirin, brilinta, statin. 2. HTN: BP still elevated. Add imdur.

## 2017-09-04 VITALS
RESPIRATION RATE: 18 BRPM | TEMPERATURE: 97.8 F | OXYGEN SATURATION: 94 % | SYSTOLIC BLOOD PRESSURE: 127 MMHG | DIASTOLIC BLOOD PRESSURE: 79 MMHG | BODY MASS INDEX: 38.36 KG/M2 | WEIGHT: 259 LBS | HEART RATE: 53 BPM | HEIGHT: 69 IN

## 2017-09-04 LAB
ANION GAP SERPL CALC-SCNC: 4 MMOL/L (ref 5–15)
BUN SERPL-MCNC: 11 MG/DL (ref 6–20)
BUN/CREAT SERPL: 8 (ref 12–20)
CALCIUM SERPL-MCNC: 9 MG/DL (ref 8.5–10.1)
CHLORIDE SERPL-SCNC: 106 MMOL/L (ref 97–108)
CO2 SERPL-SCNC: 29 MMOL/L (ref 21–32)
CREAT SERPL-MCNC: 1.4 MG/DL (ref 0.7–1.3)
ERYTHROCYTE [DISTWIDTH] IN BLOOD BY AUTOMATED COUNT: 13.7 % (ref 11.5–14.5)
GLUCOSE SERPL-MCNC: 87 MG/DL (ref 65–100)
HCT VFR BLD AUTO: 46.2 % (ref 36.6–50.3)
HGB BLD-MCNC: 16 G/DL (ref 12.1–17)
MCH RBC QN AUTO: 29.4 PG (ref 26–34)
MCHC RBC AUTO-ENTMCNC: 34.6 G/DL (ref 30–36.5)
MCV RBC AUTO: 84.8 FL (ref 80–99)
PLATELET # BLD AUTO: 269 K/UL (ref 150–400)
POTASSIUM SERPL-SCNC: 3.2 MMOL/L (ref 3.5–5.1)
RBC # BLD AUTO: 5.45 M/UL (ref 4.1–5.7)
SODIUM SERPL-SCNC: 139 MMOL/L (ref 136–145)
WBC # BLD AUTO: 13.3 K/UL (ref 4.1–11.1)

## 2017-09-04 PROCEDURE — 74011250637 HC RX REV CODE- 250/637: Performed by: INTERNAL MEDICINE

## 2017-09-04 PROCEDURE — 80048 BASIC METABOLIC PNL TOTAL CA: CPT | Performed by: INTERNAL MEDICINE

## 2017-09-04 PROCEDURE — 36415 COLL VENOUS BLD VENIPUNCTURE: CPT | Performed by: INTERNAL MEDICINE

## 2017-09-04 PROCEDURE — 74011250636 HC RX REV CODE- 250/636: Performed by: INTERNAL MEDICINE

## 2017-09-04 PROCEDURE — 85027 COMPLETE CBC AUTOMATED: CPT | Performed by: INTERNAL MEDICINE

## 2017-09-04 RX ORDER — CLOPIDOGREL BISULFATE 75 MG/1
75 TABLET ORAL DAILY
Qty: 30 TAB | Refills: 0 | Status: SHIPPED | OUTPATIENT
Start: 2017-09-04 | End: 2017-10-04

## 2017-09-04 RX ORDER — ATORVASTATIN CALCIUM 40 MG/1
40 TABLET, FILM COATED ORAL DAILY
Qty: 30 TAB | Refills: 0 | Status: SHIPPED | OUTPATIENT
Start: 2017-09-04 | End: 2017-10-04

## 2017-09-04 RX ORDER — AMLODIPINE BESYLATE 10 MG/1
10 TABLET ORAL DAILY
Qty: 30 TAB | Refills: 0 | Status: SHIPPED | OUTPATIENT
Start: 2017-09-04 | End: 2017-10-04

## 2017-09-04 RX ORDER — CLOPIDOGREL BISULFATE 75 MG/1
75 TABLET ORAL DAILY
Status: DISCONTINUED | OUTPATIENT
Start: 2017-09-04 | End: 2017-09-04 | Stop reason: HOSPADM

## 2017-09-04 RX ORDER — FAMOTIDINE 20 MG/1
20 TABLET, FILM COATED ORAL 2 TIMES DAILY
Qty: 28 TAB | Refills: 0 | Status: SHIPPED | OUTPATIENT
Start: 2017-09-04 | End: 2017-09-18

## 2017-09-04 RX ORDER — ISOSORBIDE MONONITRATE 30 MG/1
30 TABLET, EXTENDED RELEASE ORAL DAILY
Qty: 30 TAB | Refills: 0 | Status: SHIPPED | OUTPATIENT
Start: 2017-09-04 | End: 2017-09-19

## 2017-09-04 RX ORDER — POTASSIUM CHLORIDE 750 MG/1
40 TABLET, FILM COATED, EXTENDED RELEASE ORAL
Status: COMPLETED | OUTPATIENT
Start: 2017-09-04 | End: 2017-09-04

## 2017-09-04 RX ORDER — POTASSIUM CHLORIDE 750 MG/1
40 TABLET, FILM COATED, EXTENDED RELEASE ORAL
Status: DISCONTINUED | OUTPATIENT
Start: 2017-09-04 | End: 2017-09-04 | Stop reason: HOSPADM

## 2017-09-04 RX ADMIN — MORPHINE SULFATE 2 MG: 4 INJECTION, SOLUTION INTRAMUSCULAR; INTRAVENOUS at 04:36

## 2017-09-04 RX ADMIN — Medication 10 ML: at 04:36

## 2017-09-04 RX ADMIN — CLOPIDOGREL BISULFATE 75 MG: 75 TABLET ORAL at 09:30

## 2017-09-04 RX ADMIN — POTASSIUM CHLORIDE 40 MEQ: 750 TABLET, FILM COATED, EXTENDED RELEASE ORAL at 09:30

## 2017-09-04 RX ADMIN — ASPIRIN 81 MG 81 MG: 81 TABLET ORAL at 09:30

## 2017-09-04 RX ADMIN — MELATONIN 3 MG ORAL TABLET 3 MG: 3 TABLET ORAL at 02:17

## 2017-09-04 RX ADMIN — Medication 10 ML: at 02:22

## 2017-09-04 RX ADMIN — AMLODIPINE BESYLATE 10 MG: 5 TABLET ORAL at 09:31

## 2017-09-04 RX ADMIN — ONDANSETRON HYDROCHLORIDE 4 MG: 2 INJECTION, SOLUTION INTRAMUSCULAR; INTRAVENOUS at 04:33

## 2017-09-04 RX ADMIN — PANTOPRAZOLE SODIUM 40 MG: 40 TABLET, DELAYED RELEASE ORAL at 09:31

## 2017-09-04 RX ADMIN — ACETAMINOPHEN 650 MG: 325 TABLET ORAL at 07:56

## 2017-09-04 RX ADMIN — ISOSORBIDE MONONITRATE 30 MG: 30 TABLET, EXTENDED RELEASE ORAL at 09:31

## 2017-09-04 RX ADMIN — ATORVASTATIN CALCIUM 40 MG: 40 TABLET, FILM COATED ORAL at 09:31

## 2017-09-04 NOTE — PROGRESS NOTES
9/4/2017 8:49 AM    Admit Date: 9/1/2017    Admit Diagnosis: Chest pain    Subjective:     Maliha Linton reports dyspnea with brilinta. No other complaints. No further chest pain. Visit Vitals    /79    Pulse (!) 53    Temp 97.8 °F (36.6 °C)    Resp 18    Ht 5' 9\" (1.753 m)    Wt 259 lb (117.5 kg)    SpO2 94%    BMI 38.25 kg/m2     Current Facility-Administered Medications   Medication Dose Route Frequency    potassium chloride SR (KLOR-CON 10) tablet 40 mEq  40 mEq Oral NOW    clopidogrel (PLAVIX) tablet 75 mg  75 mg Oral DAILY    pantoprazole (PROTONIX) tablet 40 mg  40 mg Oral DAILY    bivalirudin (ANGIOMAX) 250 mg injection        0.9% sodium chloride infusion        adenosine (ADENOSCAN) 3 mg/mL injection        ADDaptor        bivalirudin (ANGIOMAX) 250 mg injection        0.9% sodium chloride infusion        ADDaptor        isosorbide mononitrate ER (IMDUR) tablet 30 mg  30 mg Oral DAILY    hydrALAZINE (APRESOLINE) 20 mg/mL injection 10 mg  10 mg IntraVENous Q6H PRN    ondansetron (ZOFRAN) injection 4 mg  4 mg IntraVENous Q4H PRN    aspirin chewable tablet 81 mg  81 mg Oral DAILY    atorvastatin (LIPITOR) tablet 40 mg  40 mg Oral DAILY    amLODIPine (NORVASC) tablet 10 mg  10 mg Oral DAILY    sodium chloride (NS) flush 5-10 mL  5-10 mL IntraVENous Q8H    sodium chloride (NS) flush 5-10 mL  5-10 mL IntraVENous PRN    acetaminophen (TYLENOL) tablet 650 mg  650 mg Oral Q4H PRN    melatonin tablet 3 mg  3 mg Oral QHS PRN    calcium carbonate (TUMS) chewable tablet 200 mg [elemental]  200 mg Oral Q4H PRN    morphine injection 2 mg  2 mg IntraVENous Q2H PRN         Objective:      Visit Vitals    /79    Pulse (!) 53    Temp 97.8 °F (36.6 °C)    Resp 18    Ht 5' 9\" (1.753 m)    Wt 259 lb (117.5 kg)    SpO2 94%    BMI 38.25 kg/m2       Physical Exam:  Abdomen: soft, non-tender.  Bowel sounds normal.   Extremities: no cyanosis or edema  Heart: regular rate and rhythm, S1, S2 normal, no murmur, click, rub or gallop  Lungs: clear to auscultation bilaterally  Neurologic: Grossly normal    Data Review:   Labs:    Recent Results (from the past 24 hour(s))   METABOLIC PANEL, BASIC    Collection Time: 09/04/17  2:22 AM   Result Value Ref Range    Sodium 139 136 - 145 mmol/L    Potassium 3.2 (L) 3.5 - 5.1 mmol/L    Chloride 106 97 - 108 mmol/L    CO2 29 21 - 32 mmol/L    Anion gap 4 (L) 5 - 15 mmol/L    Glucose 87 65 - 100 mg/dL    BUN 11 6 - 20 MG/DL    Creatinine 1.40 (H) 0.70 - 1.30 MG/DL    BUN/Creatinine ratio 8 (L) 12 - 20      GFR est AA >60 >60 ml/min/1.73m2    GFR est non-AA 52 (L) >60 ml/min/1.73m2    Calcium 9.0 8.5 - 10.1 MG/DL   CBC W/O DIFF    Collection Time: 09/04/17  2:22 AM   Result Value Ref Range    WBC 13.3 (H) 4.1 - 11.1 K/uL    RBC 5.45 4.10 - 5.70 M/uL    HGB 16.0 12.1 - 17.0 g/dL    HCT 46.2 36.6 - 50.3 %    MCV 84.8 80.0 - 99.0 FL    MCH 29.4 26.0 - 34.0 PG    MCHC 34.6 30.0 - 36.5 g/dL    RDW 13.7 11.5 - 14.5 %    PLATELET 861 282 - 827 K/uL       Telemetry: normal sinus rhythm      Assessment:     Active Problems:    ARF (acute renal failure) (HCC) (9/1/2017)      Essential hypertension (9/1/2017)      Troponin I above reference range (9/1/2017)      Chest pain (9/1/2017)        Plan:     1. NSTEMI: s/p PLB stent and normal FFR of LAD and RI. No BB due to bradycardia. Switch brilinta to plavix. Continue other meds. 2. HTN: continue imdur. Ok to dc from cardiac standpoint. Will f/u as out pt.

## 2017-09-04 NOTE — PROGRESS NOTES
Patient ambulated in hallway without difficulty. Dressing CDI. No complaints. Discharge instructions reviewed with patient; to be discharged to home with wife. Site care instructions reviewed; site(s) CDI. Patient instructed on which medications to continue and which to start. Prescriptions given to patient. Medication info provided and reviewed with patient. Follow-up appointment information given; follow-up appointment to be made by patient. IV and tele box removed. Opportunity for questions provided; all questions answered. All belongings returned. Patient wheeled to front door via wheelchair by nurse; to be transported home by wife.

## 2017-09-04 NOTE — CARDIO/PULMONARY
C/P rehab note- chart reviewed. S/P PCI/stenting for NSTEMI  On 9/2/2017. HX includes HTN,High Cholesterol. Nonsmoker. LVEF 55-60%. On Brilinta. Met with pt family and sig other. Given printed teaching materials on MI/folder . Instruction given on symptom identification of MI and the importance of prompt treatment,when to call 911-chest pain,shortness of breath,slurred speech or bleeding you cannot stop. Also discussed the cardiac diet (low NA/fat/chol). Reviewed progressive return to activity as tolerated with frequent rest periods as needed, taking medications as prescribed, the benefits of outpatient Cardiac Rehab and the importance of follow up visits with physician. Pt spends most time in [place] and sig other states she will make sure he follows up with Cardiologist there. Printed material given and discussed re: heart healthy habits, what to expect following coronary angioplasty, and post cardiac catheterization instructions. Discussed post catheterization restrictions, including:no manipulating the wrist for 24 hours, no lifting for 7 days, no soaking the wrist for 3 days . Also discussed what to do if bleeding or bruising at the cath insertion site is observed. Reviewed  the importance of medication compliance(Brilinta), monitoring for any unusual signs & symptoms and when to call the doctor. Verbalized understanding.

## 2017-09-04 NOTE — PROGRESS NOTES
The patient is ambulating in the hallway with family. The patient denies any chest pain although he's complaining of shortness of breath. His oxygen saturation on room air is 97%. He refused oxygen via nasal cannula as he said \"I don't need it\". He also stated \"I'm afraid of what's going on\" Explained to the patient about the side effects of his new medication Brilinta. The patient still would like to take Brilinta tonight. Update:  2047  The patient is still ambulating in the hallway with his mom. The patient stated he's \"alright\". Paged MD on call Dr. Reinaldo Hdez and informed him of the situation. Orders received. 2200  Update  The patient is now laughing at his jokes, he denies any shortness of breath. He is resting in bed watching TV, his family members in the room with him.

## 2017-09-05 ENCOUNTER — TELEPHONE (OUTPATIENT)
Dept: CARDIOLOGY CLINIC | Age: 62
End: 2017-09-05

## 2017-09-05 NOTE — TELEPHONE ENCOUNTER
MD Ashlee Adkins [facility] 2 hours ago (1:07 PM)                 no (Routing comment)         Returned pt's call and left message to return my call. Message  Received: Today       Coral Villanueva MD       Cc: Rosa Maria Tao LPN       Caller: Unspecified (Today, 12:52 PM)                     Advised patient, no driving restrictions. Noted. Thanks.

## 2017-09-05 NOTE — TELEPHONE ENCOUNTER
Does patient have any driving restrictions? He drives his [de-identified] year mother around when needed. Please call to advise. Thanks!

## 2017-09-07 ENCOUNTER — TELEPHONE (OUTPATIENT)
Dept: CARDIOLOGY CLINIC | Age: 62
End: 2017-09-07

## 2017-09-07 NOTE — TELEPHONE ENCOUNTER
Returned pt's call,verified pt with two pt identifiers, pt stated that he would like to change his Lipitor to Zocor. He said Lipitor made him feel funny such as gave him a diminished sex drive. He also stated that he has Hives on his arms,stomach that itch. They are small and going away. He thinks it could be from the [place] and they switched him over to Plavix in hospital. I advised him to use hydrocortisone cream on them, take oatmeal bath to help with itching. He has appt on 9/19/17 with you. I advised him I do not know if the dr will change medication w/o seeing him in office first but I would send him a note and ask. Advised I would call him back tomorrow. He verbalized that he understood everything. Message  Received: Today       MD Miguelangel Moore LPN       Caller: Unspecified (Yesterday,  4:14 PM)                     You can make a switch. What mg do you want him on? Thanks. Message  Received: Today       MD Miguelangel Moore LPN       Caller: Unspecified (4 days ago,  4:14 PM)                     zocor 40 mg daily       Called pt,verified pt with two pt identifiers, told pt it is okay to change to Zocor 40 mg tab daily. Went over medications and updated in cc. Pt states that his throat feels itchy and like it is going to close up, he gets itchy. He states that it is happening at night when sleeping and just sitting and watching TV. Does not happen after taking a specific med or eating something. Advised he can OTC allergy med with no D component in it. Advised him to f/u with his PCP to be evaluated, sounds like it could be anxiety also. He then stated that the Imdur is giving him chest tightness when he takes it-wants to know if he needs to continue the medication. Advised I would send his Zocor in to pharmacy. He verbalized that he understood everything.       CALLED PHARMACY AND LEFT MESSAGE ON VOICE SERVICE TO REFILL ZOCOR 40 MG TAB 1 TAB BY MOUTH DAILY 90 TAB REFILL 3 TIMES. ADVISED TO D/C THE LIPITOR. Message  Received: Today       MD Yoshi Ying LPN       Caller: Unspecified (4 days ago,  4:14 PM)                     Keep it on. Called pt and left message to stay on Imdur. Advised to call me back. Joni OLIVAREZZane Flores [phone]  Moises Erazo 1 hour ago (2:00 PM)                       Moises Erazo 1 hour ago (2:00 PM)                 Pt returning your call  Thanks      Returned pt's call and left message to return my call. Pt returned my call,verified pt with two pt identifiers, told pt that  wants him to stay on his Imdur. Advised him to keep his f/u appt with marietta. Advised him to write down all his questions and bring them to his appt and ask the then. He verbalized that he understood everything.

## 2017-09-07 NOTE — TELEPHONE ENCOUNTER
Pt doesn't want to take lipitor, he wants to change to Zocar. Pt doesn't have any med to take. Please; forward to pharmacy on file.      Thanks

## 2017-09-08 NOTE — DISCHARGE SUMMARY
Hospitalist Discharge Summary     Patient ID:  Paul Ovalle  [id number]  67 y.o.  1955    PCP on record: Liban Maldonado MD    Admit date: 9/1/2017  Discharge date and time: 9/4/17      DISCHARGE DIAGNOSIS:    Possible NSTEMI hemodynamically stable s/p stenting   -Cont asa, Brilinta and statin. Not on betablocker due to bradycardia.  -Imdur added as anti anginal therapy by cardiology and recommended to stop viagra until further  Discussion with cardiology in 2 weeks.     Chest pain atypical pain likely form GERD  -PPI and tums prn     Sinus Bradycardia asymptomatic : TSH is normal. Atenolol stopped.     HTN  -stopped  atenolol   -Continue  Norvasc and on  imdur      JIM likely prerenal resolved     Hypokalemia resolved              CONSULTATIONS:  IP CONSULT TO CARDIOLOGY    Excerpted HPI from H&P of Kieran Sales MD:    Paul Ovalle is a 63 y.o.  male who presents with recurrent chest pains. It started about 5 days ago as like heartburn located over his sternum and epigastric area. This improved with tums. It then recurred a day later and resolved with ASA and gas X. Today his heartburn recurred but felt worse with ambulation so he presented to the ER for evaluation. He was found to have an elevated troponin and JIM / Cr 1.6. Cardiology was consulted and we were asked to admit for work up and evaluation of the above problems. He has no prior history of CAD, PE or PUD.   ______________________________________________________________________  DISCHARGE SUMMARY/HOSPITAL COURSE:  for full details see H&P, daily progress notes, labs, consult notes. Possible NSTEMI hemodynamically stable s/p stenting   -Cont asa, Brilinta and statin.  Not on betablocker due to bradycardia.  -continue imdur    Chest pain atypical pain likely form GERD  -PPI and tums prn     Sinus Bradycardia asymptomatic : TSH is normal. Atenolol stopped     HTN  -stopped atenolol   Norvasc added      JIM likely prerenal resolved      Hypokalemia replaced        _______________________________________________________________________  Patient seen and examined by me on discharge day. Pertinent Findings:  Gen:    Not in distress  Chest: Clear lungs  CVS:   Regular rhythm. No edema  Abd:  Soft, not distended, not tender  Neuro:  Alert, awake   _______________________________________________________________________  DISCHARGE MEDICATIONS:   Discharge Medication List as of 9/4/2017 11:18 AM      START taking these medications    Details   amLODIPine (NORVASC) 10 mg tablet Take 1 Tab by mouth daily for 30 days. , Print, Disp-30 Tab, R-0      atorvastatin (LIPITOR) 40 mg tablet Take 1 Tab by mouth daily for 30 days. , Print, Disp-30 Tab, R-0      clopidogrel (PLAVIX) 75 mg tab Take 1 Tab by mouth daily for 30 days. , Print, Disp-30 Tab, R-0      isosorbide mononitrate ER (IMDUR) 30 mg tablet Take 1 Tab by mouth daily for 30 days. , Print, Disp-30 Tab, R-0      famotidine (PEPCID) 20 mg tablet Take 1 Tab by mouth two (2) times a day for 14 days. , Print, Disp-28 Tab, R-0         CONTINUE these medications which have NOT CHANGED    Details   aspirin delayed-release 81 mg tablet Take 81 mg by mouth daily as needed (chest pain). , Historical Med      simethicone (GAS-X) 125 mg capsule Take 125-375 mg by mouth daily as needed (chest pain). , Historical Med      TETRAHYDROZOLINE HCL (VISINE OP) Administer 2 Drops to both eyes daily as needed (dry eyes). , Historical Med      garlic cap Take 1 Cap by mouth daily. , Historical Med         STOP taking these medications       atenolol-chlorthalidone (TENORETIC) 50-25 mg per tablet Comments:   Reason for Stopping:             Patient was counseled to not take Viagra or similar medications as he is on long acting nitrates and he  Verbalized understanding the same.     My Recommended Diet, Activity, Wound Care, and follow-up labs are listed in the patient's Discharge Insturctions which I have personally completed and reviewed.     _______________________________________________________________________  DISPOSITION:    Home with Family: y   Home with HH/PT/OT/RN:    SNF/LTC:    AUGUSTUS:    OTHER:        Condition at Discharge:  Stable  _______________________________________________________________________  Follow up with:   PCP : Liban Maldonado MD  Follow-up Information     Follow up With Details Comments Contact Info    Liban Maldonado MD   Patient can only remember the practice name and not the physician      Liabn Maldonado MD In 1 week  Patient can only remember the practice name and not the physician      Farrah Braxton MD In 1 week  [address] [phone]                Total time in minutes spent coordinating this discharge (includes going over instructions, follow-up, prescriptions, and preparing report for sign off to her PCP) :  35  minutes    Signed:  Joey Vega MD

## 2017-09-11 RX ORDER — SIMVASTATIN 40 MG/1
TABLET, FILM COATED ORAL
COMMUNITY
End: 2017-09-19

## 2017-09-19 ENCOUNTER — OFFICE VISIT (OUTPATIENT)
Dept: CARDIOLOGY CLINIC | Age: 62
End: 2017-09-19

## 2017-09-19 VITALS
SYSTOLIC BLOOD PRESSURE: 150 MMHG | HEIGHT: 69 IN | HEART RATE: 68 BPM | OXYGEN SATURATION: 98 % | DIASTOLIC BLOOD PRESSURE: 104 MMHG | RESPIRATION RATE: 16 BRPM | BODY MASS INDEX: 38.88 KG/M2 | WEIGHT: 262.5 LBS

## 2017-09-19 DIAGNOSIS — Z98.61 S/P PTCA (PERCUTANEOUS TRANSLUMINAL CORONARY ANGIOPLASTY): ICD-10-CM

## 2017-09-19 DIAGNOSIS — E78.2 MIXED HYPERLIPIDEMIA: ICD-10-CM

## 2017-09-19 DIAGNOSIS — I21.4 NSTEMI (NON-ST ELEVATED MYOCARDIAL INFARCTION) (HCC): ICD-10-CM

## 2017-09-19 DIAGNOSIS — R06.83 SNORING: ICD-10-CM

## 2017-09-19 DIAGNOSIS — I25.10 CORONARY ARTERY DISEASE INVOLVING NATIVE CORONARY ARTERY OF NATIVE HEART WITHOUT ANGINA PECTORIS: Primary | ICD-10-CM

## 2017-09-19 DIAGNOSIS — I10 ESSENTIAL HYPERTENSION: ICD-10-CM

## 2017-09-19 RX ORDER — CETIRIZINE HCL 10 MG
10 TABLET ORAL DAILY
COMMUNITY

## 2017-09-19 RX ORDER — HYDRALAZINE HYDROCHLORIDE 10 MG/1
25 TABLET, FILM COATED ORAL 2 TIMES DAILY
Qty: 90 TAB | Refills: 4 | Status: SHIPPED | OUTPATIENT
Start: 2017-09-19

## 2017-09-19 RX ORDER — FAMOTIDINE 20 MG/1
20 TABLET, FILM COATED ORAL 2 TIMES DAILY
COMMUNITY

## 2017-09-19 NOTE — PROGRESS NOTES
9/19/2017 11:57 AM      Subjective:     Talia Lane is here for f/u visit after recent admission at [facility] for NSTEMI. Feels good. He denies chest pain, chest pressure/discomfort, dyspnea, palpitations, irregular heart beats, near-syncope, syncope, fatigue, orthopnea, paroxysmal nocturnal dyspnea, exertional chest pressure/discomfort, claudication, lower extremity edema. BP is elevated. Did not tolerate imdur. Visit Vitals    BP (!) 150/104 (BP 1 Location: Right arm, BP Patient Position: Sitting)    Pulse 68    Resp 16    Ht 5' 9\" (1.753 m)    Wt 262 lb 8 oz (119.1 kg)    SpO2 98%    BMI 38.76 kg/m2     Current Outpatient Prescriptions   Medication Sig    famotidine (PEPCID) 20 mg tablet Take 20 mg by mouth two (2) times a day. Patient takes as needed    cetirizine (ZYRTEC) 10 mg tablet Take 10 mg by mouth daily.  hydrALAZINE (APRESOLINE) 10 mg tablet Take 2.5 Tabs by mouth two (2) times a day.  amLODIPine (NORVASC) 10 mg tablet Take 1 Tab by mouth daily for 30 days.  atorvastatin (LIPITOR) 40 mg tablet Take 1 Tab by mouth daily for 30 days.  clopidogrel (PLAVIX) 75 mg tab Take 1 Tab by mouth daily for 30 days.  aspirin delayed-release 81 mg tablet Take 81 mg by mouth daily as needed (chest pain).  TETRAHYDROZOLINE HCL (VISINE OP) Administer 2 Drops to both eyes daily as needed (dry eyes).  simethicone (GAS-X) 125 mg capsule Take 125-375 mg by mouth daily as needed (chest pain).  garlic cap Take 1 Cap by mouth daily. No current facility-administered medications for this visit.           Objective:      Visit Vitals    BP (!) 150/104 (BP 1 Location: Right arm, BP Patient Position: Sitting)    Pulse 68    Resp 16    Ht 5' 9\" (1.753 m)    Wt 262 lb 8 oz (119.1 kg)    SpO2 98%    BMI 38.76 kg/m2       Data Review:     EKG: Sinus bradycardia, non specific T wave changes    Reviewed and/or ordered active problem list, medication list tests    Past Medical History:   Diagnosis Date    Colon polyps     High cholesterol     Hypertension       Past Surgical History:   Procedure Laterality Date    HX TONSILLECTOMY       Allergies   Allergen Reactions    Clindamycin Diarrhea    Isosorbide Hoarseness      Family History   Problem Relation Age of Onset    Coronary Artery Disease Father       Social History     Social History    Marital status: SINGLE     Spouse name: N/A    Number of children: N/A    Years of education: N/A     Occupational History    Not on file. Social History Main Topics    Smoking status: Never Smoker    Smokeless tobacco: Never Used    Alcohol use No    Drug use: No    Sexual activity: Not on file     Other Topics Concern    Not on file     Social History Narrative         Review of Systems     General: Not Present- Anorexia, Chills, Dietary Changes, Fatigue, Fever, Medication Changes, Night Sweats, Weight Gain > 10lbs. and Weight Loss > 10lbs. .  Skin: Not Present- Bruising and Excessive Sweating. HEENT: Not Present- Headache, Visual Loss and Vertigo. Respiratory: Not Present- Cough, Decreased Exercise Tolerance, Difficulty Breathing, Snoring and Wheezing. Cardiovascular: Not Present- Chest Pain, Claudications, Difficulty Breathing On Exertion, Edema, Fainting / Blacking Out, Irregular Heart Beat, Night Cramps, Orthopnea, Palpitations, Paroxysmal Nocturnal Dyspnea, Rapid Heart Rate, Shortness of Breath and Swelling of Extremities. Gastrointestinal: Not Present- Black, Tarry Stool, Bloody Stool, Diarrhea, Hematemesis, Rectal Bleeding and Vomiting. Musculoskeletal: Not Present- Muscle Pain and Muscle Weakness. Neurological: Not Present- Dizziness. Psychiatric: Not Present- Depression. Endocrine: Not Present- Cold Intolerance, Heat Intolerance and Thyroid Problems.   Hematology: Not Present- Abnormal Bleeding, Anemia, Blood Clots and Easy Bruising.       Physical Exam   The physical exam findings are as follows:       General   Mental Status - Alert. General Appearance - Not in acute distress. Chest and Lung Exam   Inspection: Accessory muscles - No use of accessory muscles in breathing. Auscultation:   Breath sounds: - Normal.      Cardiovascular   Inspection: Jugular vein - Bilateral - Inspection Normal.  Palpation/Percussion:   Apical Impulse: - Normal.  Auscultation: Rhythm - Regular. Heart Sounds - S1 WNL and S2 WNL. No S3 or S4. Murmurs & Other Heart Sounds: Auscultation of the heart reveals - No Murmurs. Carotid arteries - No Carotid bruit. Peripheral Vascular   Upper Extremity: Inspection - Bilateral - No Cyanotic nailbeds or Digital clubbing. Lower Extremity:   Palpation: Dorsalis pedis pulse - Bilateral - Normal. Posterior tibia pulse - Bilateral - Normal. Edema - Bilateral - No edema. Assessment:       ICD-10-CM ICD-9-CM    1. Coronary artery disease involving native coronary artery of native heart without angina pectoris I25.10 414.01 REFERRAL TO SLEEP STUDIES   2. Essential hypertension I10 401.9 AMB POC EKG ROUTINE W/ 12 LEADS, INTER & REP      REFERRAL TO SLEEP STUDIES   3. S/P PTCA (percutaneous transluminal coronary angioplasty) Z98.61 V45.82    4. Mixed hyperlipidemia E78.2 272.2    5. Snoring R06.83 786.09 REFERRAL TO SLEEP STUDIES   6. NSTEMI (non-ST elevated myocardial infarction) ([facility] I21.4 410.70        Plan:     1. CAD, NSTEMI: s/p PLB stent and normal FFR of LAD and RI 09/2017. No BB due to bradycardia. Continue current meds. 2. HTN: elevated. Did not tolerate imdur. Add hydralazine. Monitor BP. Check BP in office in couple of weeks. No ACEI or ARB due to renal insuff. 3. Hyperlipidemia: on statin. 4. Possible MARIAMA. Refer for sleep study.

## 2017-09-19 NOTE — MR AVS SNAPSHOT
Visit Information Date & Time Provider Department Dept. Phone Encounter #  
 9/19/2017 11:15 AM Belinda Morataya, [address] [place] [address] [facility] [phone] [id number] Follow-up Instructions Return in about 3 months (around 12/19/2017). Follow-up and Disposition History Upcoming Health Maintenance Date Due Hepatitis C Screening 1955 Pneumococcal 19-64 Highest Risk (1 of 3 - PCV13) 11/4/1976 DTaP/Tdap/Td series (1 - Tdap) 11/4/1978 FOBT Q 1 YEAR AGE 50-75 11/4/2007 INFLUENZA AGE 9 TO ADULT 8/1/2017 ZOSTER VACCINE AGE 60> 9/4/2017 Allergies as of 9/19/2017  Review Complete On: 9/19/2017 By: Belinda Morataya MD  
  
 Severity Noted Reaction Type Reactions Clindamycin High 09/03/2017    Diarrhea Isosorbide  09/19/2017    Hoarseness Current Immunizations  Never Reviewed No immunizations on file. Not reviewed this visit You Were Diagnosed With   
  
 Codes Comments Coronary artery disease involving native coronary artery of native heart without angina pectoris    -  Primary ICD-10-CM: I25.10 ICD-9-CM: 414.01 Essential hypertension     ICD-10-CM: I10 
ICD-9-CM: 401.9 S/P PTCA (percutaneous transluminal coronary angioplasty)     ICD-10-CM: Z98.61 ICD-9-CM: V45.82 Mixed hyperlipidemia     ICD-10-CM: E78.2 ICD-9-CM: 272.2 Snoring     ICD-10-CM: R06.83 
ICD-9-CM: 786.09   
 [facility]STEM[facility] (non[facility]ST elevated [facility]) [facility]     ICD-10-CM: I21.4 ICD-9-CM: 410.70 Vitals BP Pulse Resp Height(growth percentile) Weight(growth percentile) SpO2  
 (!) 150/104 (BP 1 Location: Right arm, BP Patient Position: Sitting) 68 16 5' 9\" (1.753 m) 262 lb 8 oz (119.1 kg) 98% BMI Smoking Status 38.76 kg/m2 Never Smoker Vitals History BMI and BSA Data Body Mass Index Body Surface Area 38.76 kg/m 2 2.41 m 2 Preferred Pharmacy Pharmacy Name Phone [id number] Brito [address] [address] [phone] Your Updated Medication List  
  
   
This list is accurate as of: 9/19/17 12:04 PM.  Always use your most recent med list. amLODIPine 10 mg tablet Commonly known as:  Job Dub Take 1 Tab by mouth daily for 30 days. aspirin delayed-release 81 mg tablet Take 81 mg by mouth daily as needed (chest pain). atorvastatin 40 mg tablet Commonly known as:  LIPITOR Take 1 Tab by mouth daily for 30 days. clopidogrel 75 mg Tab Commonly known as:  PLAVIX Take 1 Tab by mouth daily for 30 days. famotidine 20 mg tablet Commonly known as:  PEPCID Take 20 mg by mouth two (2) times a day. Patient takes as needed  
  
 garlic Cap Take 1 Cap by mouth daily. Gas-X 125 mg capsule Generic drug:  simethicone Take 125-375 mg by mouth daily as needed (chest pain). hydrALAZINE 10 mg tablet Commonly known as:  APRESOLINE Take 2.5 Tabs by mouth two (2) times a day. VISINE OP Administer 2 Drops to both eyes daily as needed (dry eyes). ZyrTEC 10 mg tablet Generic drug:  cetirizine Take 10 mg by mouth daily. Prescriptions Sent to Pharmacy Refills  
 hydrALAZINE (APRESOLINE) 10 mg tablet 4 Sig: Take 2.5 Tabs by mouth two (2) times a day. Class: Normal  
 Pharmacy: [facility] #[id number] Val Barlow, [address] Ph #: [phone] Route: Oral  
  
We Performed the Following AMB POC EKG ROUTINE W/ 12 LEADS, INTER & REP [19639 CPT(R)] REFERRAL TO SLEEP STUDIES [REF99 Custom] Follow-up Instructions Return in about 3 months (around 12/19/2017). Referral Information Referral ID Referred By Referred To  
  
 [id number] [facility] Not Available Visits Status Start Date End Date 1 New Request 9/19/17 9/19/18 If your referral has a status of pending review or denied, additional information will be sent to support the outcome of this decision. Introducing [facility]! Shawn Chavez introduces InboxFever patient portal. Now you can access parts of your medical record, email your doctor's office, and request medication refills online. 1. In your internet browser, go to [email or web address] 2. Click on the First Time User? Click Here link in the Sign In box. You will see the New Member Sign Up page. 3. Enter your InboxFever Access Code exactly as it appears below. You will not need to use this code after youve completed the sign-up process. If you do not sign up before the expiration date, you must request a new code. · InboxFever Access Code: [email or web address] Expires: 11/30/2017  3:07 PM 
 
4. Enter the last four digits of your Social Security Number (xxxx) and Date of Birth (mm/dd/yyyy) as indicated and click Submit. You will be taken to the next sign-up page. 5. Create a InboxFever ID. This will be your InboxFever login ID and cannot be changed, so think of one that is secure and easy to remember. 6. Create a InboxFever password. You can change your password at any time. 7. Enter your Password Reset Question and Answer. This can be used at a later time if you forget your password. 8. Enter your e-mail address. You will receive e-mail notification when new information is available in [address] 9. Click Sign Up. You can now view and download portions of your medical record. 10. Click the Download Summary menu link to download a portable copy of your medical information. If you have questions, please visit the Frequently Asked Questions section of the InboxFever website. Remember, InboxFever is NOT to be used for urgent needs. For medical emergencies, dial 911. Now available from your iPhone and Android! Please provide this summary of care documentation to your next provider. Your primary care clinician is listed as Phys Other. If you have any questions after today's visit, please call [phone].

## 2017-09-21 ENCOUNTER — TELEPHONE (OUTPATIENT)
Dept: CARDIOLOGY CLINIC | Age: 62
End: 2017-09-21

## 2017-09-21 DIAGNOSIS — I10 ESSENTIAL HYPERTENSION: Primary | ICD-10-CM

## 2017-09-21 RX ORDER — LISINOPRIL 2.5 MG/1
TABLET ORAL DAILY
COMMUNITY

## 2017-09-21 NOTE — TELEPHONE ENCOUNTER
Returned pt's call,verified pt with two pt identifiers, pt stated that he started Hydralazine on 9/19/17. He states that the medication is making him nervous and giving him the shakes. He wants to know if he can switch medication. Chloe advise, thank you. Message  Received: Today       MD Caro Norris, MALCOLM       Caller: Unspecified (Today, 11:18 AM)                     Have him try lisinopril 2.5 daily. However due to renal concern I want him to check BMP next week. Disp 30 tab. Dc hydralazine. Called pt,verified pt with two pt identifiers, told pt that he can stop his Hydralazine.  wants him to try Lisinopril 2.5 g tab daily. Advised to check his labs in 1 week. Advised I would put lab slip up front for him to p/u then get labs done. Advised I would call in Rx to pharmacy. He verbalized that he understood everything. Called [facility] pharmacy and left message on phone stating that we are calling in Lisinopril 2.5 mg tab 1 tab by mouth daily disp 30 tabs refill 2 times. Advised to call if needed. Put lab slip up front for p/u.

## 2017-09-26 ENCOUNTER — TELEPHONE (OUTPATIENT)
Dept: CARDIOLOGY CLINIC | Age: 62
End: 2017-09-26

## 2017-09-26 NOTE — TELEPHONE ENCOUNTER
Returned pt's call and left message stating that we do not have lab results in yet or he has not had them done. Advised to call back if needed. Pt returned my call,verified pt with two pt identifiers, pt wanted to know if I had his lab slip ready. Advised his lab slip is up front at the desk ready for him to p/u. He then stated that he is having hand pain by his pinkie. He wanted to know if this could be related to his cardiac cath. He stated that there is no signs of infection at the site or pain at site. Advised him that he should f/u with his PCP for hand pain as I did not think it is from his cardiac cath. He verbalized that he understood everything.

## 2017-09-29 ENCOUNTER — HOSPITAL ENCOUNTER (OUTPATIENT)
Dept: LAB | Age: 62
Discharge: HOME | End: 2017-09-29
Payer: MEDICARE

## 2017-09-29 PROCEDURE — 36415 COLL VENOUS BLD VENIPUNCTURE: CPT

## 2017-09-29 PROCEDURE — 80048 BASIC METABOLIC PNL TOTAL CA: CPT

## 2017-09-30 LAB
BUN SERPL-MCNC: 10 MG/DL (ref 6–24)
BUN/CREAT SERPL: 7 (ref 9–20)
CALCIUM SERPL-MCNC: 9.9 MG/DL (ref 8.7–10.2)
CHLORIDE SERPL-SCNC: 104 MMOL/L (ref 96–106)
CO2 SERPL-SCNC: 23 MMOL/L (ref 18–29)
CREAT SERPL-MCNC: 1.38 MG/DL (ref 0.76–1.27)
GLUCOSE SERPL-MCNC: 86 MG/DL (ref 65–99)
INTERPRETATION: NORMAL
POTASSIUM SERPL-SCNC: 4.3 MMOL/L (ref 3.5–5.2)
SODIUM SERPL-SCNC: 143 MMOL/L (ref 134–144)

## 2017-10-03 ENCOUNTER — TELEPHONE (OUTPATIENT)
Dept: CARDIOLOGY CLINIC | Age: 62
End: 2017-10-03

## 2017-10-03 NOTE — TELEPHONE ENCOUNTER
----- Message from Alicia Aviles MD sent at 9/30/2017 12:15 PM EDT -----  Inform him labs are ok. Called pt and left message to call me back. Pt returned my call,verified pt with two pt identifiers, told pt his labs are okay. He then stated that he needs refills on Plavix and Zocor sent to a pharmacy in [place]. Phone number [phone]. He verbalized that he understood everything. Called Pharmacy in [place] and called in Zocor 40 mg tab 1 tab by mouth daily disp 90 refill 1. Called in Plavix 75 mg tab 1 tab by mouth daily disp 90 refill 1. She verbalized that she understood everything and would get that ready.

## 2017-10-04 RX ORDER — SIMVASTATIN 40 MG/1
40 TABLET, FILM COATED ORAL
COMMUNITY

## 2017-10-05 ENCOUNTER — TELEPHONE (OUTPATIENT)
Dept: CARDIOLOGY CLINIC | Age: 62
End: 2017-10-05

## 2017-10-05 NOTE — TELEPHONE ENCOUNTER
Pt wants to know if okay for him to take a garlic supplement. Please advise, thank you. Message  Received: Today       MD Ramone Shoemaker LPN       Caller: Unspecified (Today,  1:10 PM)                     yes       Pt called in,verified pt with two pt identifiers, told pt that it is okay for him to take garlic supplement with current medications. He verbalized that he understood everything.

## 2017-10-18 ENCOUNTER — APPOINTMENT (OUTPATIENT)
Dept: CARDIOLOGY | Facility: CLINIC | Age: 62
End: 2017-10-18
Payer: MEDICARE

## 2017-10-18 VITALS — BODY MASS INDEX: 37.77 KG/M2 | OXYGEN SATURATION: 98 % | HEIGHT: 69 IN | WEIGHT: 255 LBS | HEART RATE: 61 BPM

## 2017-10-18 VITALS — SYSTOLIC BLOOD PRESSURE: 164 MMHG | DIASTOLIC BLOOD PRESSURE: 100 MMHG

## 2017-10-18 DIAGNOSIS — Z87.891 PERSONAL HISTORY OF NICOTINE DEPENDENCE: ICD-10-CM

## 2017-10-18 DIAGNOSIS — Z83.49 FAMILY HISTORY OF OTHER ENDOCRINE, NUTRITIONAL AND METABOLIC DISEASES: ICD-10-CM

## 2017-10-18 DIAGNOSIS — I25.2 OLD MYOCARDIAL INFARCTION: ICD-10-CM

## 2017-10-18 DIAGNOSIS — Z80.42 FAMILY HISTORY OF MALIGNANT NEOPLASM OF PROSTATE: ICD-10-CM

## 2017-10-18 DIAGNOSIS — Z82.49 FAMILY HISTORY OF ISCHEMIC HEART DISEASE AND OTHER DISEASES OF THE CIRCULATORY SYSTEM: ICD-10-CM

## 2017-10-18 DIAGNOSIS — Z86.39 PERSONAL HISTORY OF OTHER ENDOCRINE, NUTRITIONAL AND METABOLIC DISEASE: ICD-10-CM

## 2017-10-18 DIAGNOSIS — Z86.79 PERSONAL HISTORY OF OTHER DISEASES OF THE CIRCULATORY SYSTEM: ICD-10-CM

## 2017-10-18 DIAGNOSIS — E66.3 OVERWEIGHT: ICD-10-CM

## 2017-10-18 PROCEDURE — 99205 OFFICE O/P NEW HI 60 MIN: CPT | Mod: 25

## 2017-10-18 PROCEDURE — 93000 ELECTROCARDIOGRAM COMPLETE: CPT

## 2017-10-18 RX ORDER — HYDRALAZINE HYDROCHLORIDE 10 MG/1
10 TABLET ORAL
Qty: 90 | Refills: 0 | Status: DISCONTINUED | COMMUNITY
Start: 2017-09-19

## 2017-10-18 RX ORDER — LISINOPRIL 10 MG/1
10 TABLET ORAL
Qty: 90 | Refills: 1 | Status: ACTIVE | COMMUNITY
Start: 2017-10-18

## 2017-10-18 RX ORDER — ATENOLOL AND CHLORTHALIDONE 50; 25 MG/1; MG/1
50-25 TABLET ORAL
Qty: 90 | Refills: 0 | Status: DISCONTINUED | COMMUNITY
Start: 2017-06-23

## 2017-10-18 RX ORDER — GABAPENTIN 100 MG/1
100 CAPSULE ORAL
Qty: 90 | Refills: 0 | Status: DISCONTINUED | COMMUNITY
Start: 2017-08-04

## 2017-10-18 RX ORDER — AMLODIPINE BESYLATE 10 MG/1
10 TABLET ORAL
Qty: 30 | Refills: 0 | Status: ACTIVE | COMMUNITY
Start: 2017-09-08

## 2017-10-18 RX ORDER — FAMOTIDINE 20 MG/1
20 TABLET, FILM COATED ORAL
Qty: 28 | Refills: 0 | Status: DISCONTINUED | COMMUNITY
Start: 2017-09-05

## 2017-10-18 RX ORDER — HYDROCHLOROTHIAZIDE 12.5 MG/1
12.5 TABLET ORAL DAILY
Qty: 90 | Refills: 1 | Status: ACTIVE | COMMUNITY
Start: 2017-10-18 | End: 1900-01-01

## 2017-10-18 RX ORDER — METHOCARBAMOL 500 MG/1
500 TABLET, FILM COATED ORAL
Qty: 60 | Refills: 0 | Status: DISCONTINUED | COMMUNITY
Start: 2017-08-04

## 2017-10-18 RX ORDER — SIMVASTATIN 40 MG/1
40 TABLET, FILM COATED ORAL
Qty: 90 | Refills: 0 | Status: DISCONTINUED | COMMUNITY
Start: 2017-09-16

## 2017-10-18 RX ORDER — SERTRALINE HYDROCHLORIDE 50 MG/1
50 TABLET, FILM COATED ORAL
Qty: 30 | Refills: 0 | Status: DISCONTINUED | COMMUNITY
Start: 2017-10-01

## 2017-10-18 RX ORDER — LISINOPRIL 2.5 MG/1
2.5 TABLET ORAL
Qty: 30 | Refills: 0 | Status: ACTIVE | COMMUNITY
Start: 2017-09-21

## 2017-10-18 RX ORDER — ALPRAZOLAM 0.25 MG/1
0.25 TABLET ORAL
Qty: 30 | Refills: 0 | Status: DISCONTINUED | COMMUNITY
Start: 2017-10-17

## 2017-10-18 RX ORDER — CLOPIDOGREL BISULFATE 75 MG/1
75 TABLET, FILM COATED ORAL
Qty: 90 | Refills: 0 | Status: ACTIVE | COMMUNITY
Start: 2017-10-04

## 2017-10-18 RX ORDER — ISOSORBIDE MONONITRATE 30 MG/1
30 TABLET, EXTENDED RELEASE ORAL
Qty: 30 | Refills: 0 | Status: DISCONTINUED | COMMUNITY
Start: 2017-09-07

## 2017-10-18 RX ORDER — NAPROXEN 500 MG/1
500 TABLET ORAL
Qty: 90 | Refills: 0 | Status: DISCONTINUED | COMMUNITY
Start: 2017-08-04

## 2017-10-18 RX ORDER — ATORVASTATIN CALCIUM 40 MG/1
40 TABLET, FILM COATED ORAL
Qty: 90 | Refills: 0 | Status: ACTIVE | COMMUNITY
Start: 2017-10-17 | End: 1900-01-01

## 2017-11-07 ENCOUNTER — APPOINTMENT (OUTPATIENT)
Dept: CARDIOLOGY | Facility: CLINIC | Age: 62
End: 2017-11-07

## 2017-11-09 ENCOUNTER — APPOINTMENT (OUTPATIENT)
Dept: CARDIOLOGY | Facility: CLINIC | Age: 62
End: 2017-11-09
Payer: MEDICARE

## 2017-11-09 PROCEDURE — 93307 TTE W/O DOPPLER COMPLETE: CPT

## 2017-11-14 ENCOUNTER — APPOINTMENT (OUTPATIENT)
Dept: CARDIOLOGY | Facility: CLINIC | Age: 62
End: 2017-11-14

## 2017-11-15 ENCOUNTER — TELEPHONE (OUTPATIENT)
Dept: CARDIOLOGY CLINIC | Age: 62
End: 2017-11-15

## 2017-11-15 NOTE — TELEPHONE ENCOUNTER
Pt states he had testing done at the hospital and wanted the results. He, also, wanted to know if you could fax the results to him. Please give patient a call back.     Thanks,    [employer]

## 2017-11-16 NOTE — TELEPHONE ENCOUNTER
Left message to return my call. Left 2nd message to call me back regarding his test results and what he wanted. I have called pt twice but no response from pt. Will wait to hear from him. I will close out encounter for now.

## 2017-11-20 ENCOUNTER — APPOINTMENT (OUTPATIENT)
Dept: CARDIOLOGY | Facility: CLINIC | Age: 62
End: 2017-11-20

## 2018-03-09 RX ORDER — CLOPIDOGREL BISULFATE 75 MG/1
TABLET ORAL
Qty: 90 TAB | Refills: 1 | Status: SHIPPED | OUTPATIENT
Start: 2018-03-09 | End: 2018-08-14 | Stop reason: SDUPTHER

## 2018-03-24 ENCOUNTER — TELEPHONE (OUTPATIENT)
Dept: CARDIOLOGY CLINIC | Age: 63
End: 2018-03-24

## 2018-03-24 NOTE — PROGRESS NOTES
Pt called on call with a question about stopping plavix. He has an urgent tooth pulling Monday. He is post mi, stent 6 mo ago. Advised pt to make oral surgeon aware and to continue taking plavix.  Verbalized understanding

## 2018-04-17 ENCOUNTER — RX RENEWAL (OUTPATIENT)
Age: 63
End: 2018-04-17

## 2018-07-26 RX ORDER — CLOPIDOGREL BISULFATE 75 MG/1
TABLET ORAL
Qty: 90 TAB | Refills: 0 | OUTPATIENT
Start: 2018-07-26

## 2018-07-26 NOTE — TELEPHONE ENCOUNTER
Called [facility] and asked if pt has used his refill from 3/9/18. She looked in system and the refill did not cross over but when she checked the original script she could see the 1 refill. She advised that they would honor the 1 refill. Advised her pt is not allowed any more refills until he sees the  In office. She advised that she would get the Plavix refill ready and understood everything.

## 2018-08-01 ENCOUNTER — RX RENEWAL (OUTPATIENT)
Age: 63
End: 2018-08-01

## 2018-08-15 RX ORDER — CLOPIDOGREL BISULFATE 75 MG/1
TABLET ORAL
Qty: 90 TAB | Refills: 0 | Status: SHIPPED | OUTPATIENT
Start: 2018-08-15 | End: 2018-12-16 | Stop reason: SDUPTHER

## 2018-10-08 ENCOUNTER — RX RENEWAL (OUTPATIENT)
Age: 63
End: 2018-10-08

## 2018-11-15 ENCOUNTER — RX RENEWAL (OUTPATIENT)
Age: 63
End: 2018-11-15

## 2018-12-17 RX ORDER — CLOPIDOGREL BISULFATE 75 MG/1
TABLET ORAL
Qty: 90 TAB | Refills: 0 | Status: SHIPPED | OUTPATIENT
Start: 2018-12-17 | End: 2019-03-26 | Stop reason: SDUPTHER

## 2018-12-21 ENCOUNTER — RX RENEWAL (OUTPATIENT)
Age: 63
End: 2018-12-21

## 2019-03-26 RX ORDER — CLOPIDOGREL BISULFATE 75 MG/1
TABLET ORAL
Qty: 90 TAB | Refills: 0 | Status: SHIPPED | OUTPATIENT
Start: 2019-03-26

## 2019-03-26 RX ORDER — CLOPIDOGREL BISULFATE 75 MG/1
TABLET ORAL
Qty: 30 TAB | Refills: 0 | Status: SHIPPED | OUTPATIENT
Start: 2019-03-26

## 2019-03-27 NOTE — TELEPHONE ENCOUNTER
Justin Eubanks MD 17 hours ago (3:25 PM)      Can you deny this. Pt was last seen on 9/19/17 and to f/u in 3 months. He has no showed and moved to [place]. Thanks. Called pharmacist in [facility] in [place], advised if they received the script for pt's Plavix. He advised that they received two scripts for 30 day and 90 day. Advised that we would like to D/C the 90 and fill the 30 day. Advised to let the pt know further refills need to come from his cardiology in [place]. The pharmacist advised that they could do that . He verbalized understanding.

## 2022-03-18 PROBLEM — R77.8 TROPONIN I ABOVE REFERENCE RANGE: Status: ACTIVE | Noted: 2017-09-01

## 2022-03-19 PROBLEM — I10 ESSENTIAL HYPERTENSION: Status: ACTIVE | Noted: 2017-09-01

## 2022-03-19 PROBLEM — I25.10 CORONARY ARTERY DISEASE INVOLVING NATIVE CORONARY ARTERY OF NATIVE HEART WITHOUT ANGINA PECTORIS: Status: ACTIVE | Noted: 2017-09-19

## 2022-03-19 PROBLEM — Z98.61 S/P PTCA (PERCUTANEOUS TRANSLUMINAL CORONARY ANGIOPLASTY): Status: ACTIVE | Noted: 2017-09-19

## 2022-03-19 PROBLEM — N17.9 ARF (ACUTE RENAL FAILURE) (HCC): Status: ACTIVE | Noted: 2017-09-01

## 2022-03-19 PROBLEM — I21.4 NSTEMI (NON-ST ELEVATED MYOCARDIAL INFARCTION) (HCC): Status: ACTIVE | Noted: 2017-09-19

## 2022-03-20 PROBLEM — R07.9 CHEST PAIN: Status: ACTIVE | Noted: 2017-09-01

## 2022-03-20 PROBLEM — E78.2 MIXED HYPERLIPIDEMIA: Status: ACTIVE | Noted: 2017-09-19

## 2022-04-01 ENCOUNTER — OFFICE VISIT (OUTPATIENT)
Dept: URGENT CARE | Age: 67
End: 2022-04-01

## 2022-04-01 VITALS — TEMPERATURE: 97.8 F | RESPIRATION RATE: 16 BRPM | HEART RATE: 64 BPM | OXYGEN SATURATION: 100 %

## 2022-04-01 DIAGNOSIS — Z20.822 ENCOUNTER FOR LABORATORY TESTING FOR COVID-19 VIRUS: Primary | ICD-10-CM

## 2022-04-01 LAB — SARS-COV-2 PCR, POC: NEGATIVE

## 2022-04-01 PROCEDURE — 99211 OFF/OP EST MAY X REQ PHY/QHP: CPT | Performed by: NURSE PRACTITIONER

## 2022-04-01 PROCEDURE — 87635 SARS-COV-2 COVID-19 AMP PRB: CPT | Performed by: NURSE PRACTITIONER

## 2022-12-16 ENCOUNTER — OFFICE VISIT (OUTPATIENT)
Dept: URGENT CARE | Age: 67
End: 2022-12-16
Payer: MEDICARE

## 2022-12-16 VITALS
TEMPERATURE: 98.2 F | RESPIRATION RATE: 16 BRPM | HEART RATE: 78 BPM | BODY MASS INDEX: 39.08 KG/M2 | OXYGEN SATURATION: 98 % | SYSTOLIC BLOOD PRESSURE: 179 MMHG | WEIGHT: 273 LBS | HEIGHT: 70 IN | DIASTOLIC BLOOD PRESSURE: 102 MMHG

## 2022-12-16 DIAGNOSIS — J06.9 VIRAL URI WITH COUGH: Primary | ICD-10-CM

## 2022-12-16 LAB — SARS-COV-2 PCR, POC: NEGATIVE

## 2022-12-16 RX ORDER — ALBUTEROL SULFATE 90 UG/1
2 AEROSOL, METERED RESPIRATORY (INHALATION)
Qty: 1 EACH | Refills: 0 | Status: SHIPPED | OUTPATIENT
Start: 2022-12-16

## 2022-12-16 RX ORDER — BENZONATATE 200 MG/1
200 CAPSULE ORAL
Qty: 21 CAPSULE | Refills: 0 | Status: SHIPPED | OUTPATIENT
Start: 2022-12-16 | End: 2022-12-23

## 2022-12-16 RX ORDER — LOSARTAN POTASSIUM AND HYDROCHLOROTHIAZIDE 12.5; 1 MG/1; MG/1
1 TABLET ORAL DAILY
COMMUNITY
Start: 2022-05-10

## 2022-12-16 RX ORDER — IPRATROPIUM BROMIDE 21 UG/1
2 SPRAY, METERED NASAL 2 TIMES DAILY
Qty: 30 ML | Refills: 0 | Status: SHIPPED | OUTPATIENT
Start: 2022-12-16

## 2022-12-16 NOTE — PROGRESS NOTES
Subjective: (As above and below)     The patient/guardian gave verbal consent to treat. Chief Complaint   Patient presents with    Cough     Pt. C/o cough and sore throat starting Mon. Denies exposure      Swetha Erwin is a 74 y.o. male who presents for evaluation of : cough, sore throat, congestion for 4 days. Preceding illness: none. No other identified aggravating or alleviating factors. Symptoms are constant and overall unchanged. Denies fever, SOB, or known exposures. Has done well with albuterol in past for similar symptoms; denies hx of asthma. ROS  Review of Systems - negative except as listed above    Reviewed PmHx, RxHx, FmHx, SocHx, AllgHx and updated in chart. Family History   Problem Relation Age of Onset    Coronary Art Dis Father        Past Medical History:   Diagnosis Date    Colon polyps     High cholesterol     Hypertension       Social History     Socioeconomic History    Marital status: SINGLE   Tobacco Use    Smoking status: Never    Smokeless tobacco: Never   Substance and Sexual Activity    Alcohol use: No    Drug use: No          Current Outpatient Medications   Medication Sig    losartan-hydroCHLOROthiazide (HYZAAR) 100-12.5 mg per tablet Take 1 Tablet by mouth daily. albuterol (PROVENTIL HFA, VENTOLIN HFA, PROAIR HFA) 90 mcg/actuation inhaler Take 2 Puffs by inhalation every six (6) hours as needed for Wheezing. ipratropium (ATROVENT) 21 mcg (0.03 %) nasal spray 2 Sprays by Both Nostrils route two (2) times a day. benzonatate (TESSALON) 200 mg capsule Take 1 Capsule by mouth three (3) times daily as needed for Cough for up to 7 days. cetirizine (ZYRTEC) 10 mg tablet Take 10 mg by mouth daily. aspirin delayed-release 81 mg tablet Take 81 mg by mouth daily as needed (chest pain).     clopidogrel (PLAVIX) 75 mg tab TAKE 1 TABLET DAILY (Patient not taking: Reported on 12/16/2022)    clopidogrel (PLAVIX) 75 mg tab TAKE 1 TABLET DAILY (Patient not taking: Reported on 12/16/2022)    simvastatin (ZOCOR) 40 mg tablet Take 40 mg by mouth nightly. lisinopril (PRINIVIL, ZESTRIL) 2.5 mg tablet Take  by mouth daily. (Patient not taking: Reported on 12/16/2022)    famotidine (PEPCID) 20 mg tablet Take 20 mg by mouth two (2) times a day. Patient takes as needed (Patient not taking: Reported on 12/16/2022)    hydrALAZINE (APRESOLINE) 10 mg tablet Take 2.5 Tabs by mouth two (2) times a day. (Patient not taking: Reported on 12/16/2022)    simethicone (GAS-X) 125 mg capsule Take 125-375 mg by mouth daily as needed (chest pain). TETRAHYDROZOLINE HCL (VISINE OP) Administer 2 Drops to both eyes daily as needed (dry eyes). garlic cap Take 1 Cap by mouth daily. No current facility-administered medications for this visit. Objective:     Vitals:    12/16/22 1526 12/16/22 1604   BP: (!) 161/97 (!) 179/102   Pulse: 78    Resp: 16    Temp: 98.2 °F (36.8 °C)    SpO2: 98%    Weight: 273 lb (123.8 kg)    Height: 5' 10\" (1.778 m)        Physical Exam  General appearance - appears well hydrated and does not appear toxic, no acute distress  Eyes - EOMs intact. Non injected. No scleral icterus   Ears - no external swelling. TMs normal bilat. Nose - nasal congestion. No purulent drainage  Mouth - OP clear without swelling, exudate or lesion. Mucus membranes moist. Uvula midline. Neck/Lymphatics - trachea midline, full AROM, no LAD of neck  Chest - Normal breathing effort no wheeze rales, rhonchi or diminishments bilaterally. Heart - RRR, no murmurs  Skin - no observable rashes or pallor  Neurologic- alert and oriented x 3  Psychiatric- normal mood, behavior and though content. Assessment/ Plan:     1. Viral URI with cough    - POCT COVID-19, SARS-COV-2, PCR  - albuterol (PROVENTIL HFA, VENTOLIN HFA, PROAIR HFA) 90 mcg/actuation inhaler; Take 2 Puffs by inhalation every six (6) hours as needed for Wheezing.   Dispense: 1 Each; Refill: 0  - ipratropium (ATROVENT) 21 mcg (0.03 %) nasal spray; 2 Sprays by Both Nostrils route two (2) times a day. Dispense: 30 mL; Refill: 0  - benzonatate (TESSALON) 200 mg capsule; Take 1 Capsule by mouth three (3) times daily as needed for Cough for up to 7 days. Dispense: 21 Capsule; Refill: 0      Covid 19 test result today negative  Likely viral illness/viral cough  Patient requests albuterol; have sent in to use for cough. Ipratropium nasal spray for nasal congestion. No evidence suggesting complication of illness at this time. Will discharge home with close monitoring and follow up. Supportive home care advised- maintain adequate fluid intake, over the counter Tylenol (for fever, aches, pains, chills), deep breathing exercises, nasal saline sprays for congestion, humidified air bedroom at night  Recommendation for self isolation/quarantine based on current CDC guidelines      Test Results:  Recent Results (from the past 6 hour(s))   POCT COVID-19, SARS-COV-2, PCR    Collection Time: 12/16/22  4:16 PM   Result Value Ref Range    SARS-COV-2 PCR, POC Negative Negative       Follow up: Follow up immediately for any new, worsening or changes or if symptoms are not improving over the next 5-7 days.          Alexa Elena NP

## 2023-04-29 RX ORDER — FAMOTIDINE 20 MG/1
TABLET, FILM COATED ORAL 2 TIMES DAILY
COMMUNITY

## 2023-04-29 RX ORDER — SIMVASTATIN 40 MG
TABLET ORAL
COMMUNITY

## 2023-04-29 RX ORDER — IPRATROPIUM BROMIDE 21 UG/1
2 SPRAY, METERED NASAL 2 TIMES DAILY
COMMUNITY
Start: 2022-12-16

## 2023-04-29 RX ORDER — HYDRALAZINE HYDROCHLORIDE 10 MG/1
TABLET, FILM COATED ORAL 2 TIMES DAILY
COMMUNITY
Start: 2017-09-19

## 2023-04-29 RX ORDER — LOSARTAN POTASSIUM AND HYDROCHLOROTHIAZIDE 12.5; 1 MG/1; MG/1
1 TABLET ORAL DAILY
COMMUNITY
Start: 2022-05-10

## 2023-04-29 RX ORDER — CLOPIDOGREL BISULFATE 75 MG/1
TABLET ORAL
COMMUNITY
Start: 2019-03-26

## 2023-04-29 RX ORDER — CETIRIZINE HYDROCHLORIDE 10 MG/1
TABLET ORAL DAILY
COMMUNITY

## 2023-04-29 RX ORDER — LISINOPRIL 2.5 MG/1
TABLET ORAL DAILY
COMMUNITY

## 2023-04-29 RX ORDER — SIMETHICONE 125 MG
CAPSULE ORAL DAILY PRN
COMMUNITY

## 2023-04-29 RX ORDER — ALBUTEROL SULFATE 90 UG/1
2 AEROSOL, METERED RESPIRATORY (INHALATION) EVERY 6 HOURS PRN
COMMUNITY
Start: 2022-12-16

## 2023-04-29 RX ORDER — ASPIRIN 81 MG/1
TABLET ORAL DAILY PRN
COMMUNITY

## 2024-11-18 ENCOUNTER — NON-APPOINTMENT (OUTPATIENT)
Age: 69
End: 2024-11-18

## 2025-04-04 ENCOUNTER — APPOINTMENT (OUTPATIENT)
Dept: ORTHOPEDIC SURGERY | Facility: CLINIC | Age: 70
End: 2025-04-04
Payer: MEDICARE

## 2025-04-04 DIAGNOSIS — Z00.00 ENCOUNTER FOR GENERAL ADULT MEDICAL EXAMINATION W/OUT ABNORMAL FINDINGS: ICD-10-CM

## 2025-04-04 DIAGNOSIS — R20.0 ANESTHESIA OF SKIN: ICD-10-CM

## 2025-04-04 DIAGNOSIS — M76.822 POSTERIOR TIBIAL TENDINITIS, LEFT LEG: ICD-10-CM

## 2025-04-04 PROCEDURE — 73630 X-RAY EXAM OF FOOT: CPT | Mod: LT

## 2025-04-04 PROCEDURE — L1902: CPT | Mod: KX,LT

## 2025-04-04 PROCEDURE — 73600 X-RAY EXAM OF ANKLE: CPT | Mod: LT

## 2025-04-04 PROCEDURE — 99204 OFFICE O/P NEW MOD 45 MIN: CPT

## 2025-04-04 RX ORDER — MELOXICAM 15 MG/1
15 TABLET ORAL DAILY
Qty: 30 | Refills: 1 | Status: ACTIVE | COMMUNITY
Start: 2025-04-04 | End: 2025-06-03

## 2025-05-02 ENCOUNTER — APPOINTMENT (OUTPATIENT)
Dept: ORTHOPEDIC SURGERY | Facility: CLINIC | Age: 70
End: 2025-05-02
Payer: MEDICARE

## 2025-05-02 DIAGNOSIS — R20.0 ANESTHESIA OF SKIN: ICD-10-CM

## 2025-05-02 DIAGNOSIS — M76.822 POSTERIOR TIBIAL TENDINITIS, LEFT LEG: ICD-10-CM

## 2025-05-02 PROCEDURE — 99213 OFFICE O/P EST LOW 20 MIN: CPT

## 2025-05-30 ENCOUNTER — APPOINTMENT (OUTPATIENT)
Dept: ORTHOPEDIC SURGERY | Facility: CLINIC | Age: 70
End: 2025-05-30